# Patient Record
Sex: FEMALE | Race: WHITE | HISPANIC OR LATINO | Employment: FULL TIME | URBAN - METROPOLITAN AREA
[De-identification: names, ages, dates, MRNs, and addresses within clinical notes are randomized per-mention and may not be internally consistent; named-entity substitution may affect disease eponyms.]

---

## 2024-03-26 ENCOUNTER — HOSPITAL ENCOUNTER (EMERGENCY)
Facility: HOSPITAL | Age: 29
Discharge: HOME | End: 2024-03-26
Attending: EMERGENCY MEDICINE | Admitting: EMERGENCY MEDICINE

## 2024-03-26 ENCOUNTER — APPOINTMENT (OUTPATIENT)
Dept: ULTRASOUND IMAGING | Facility: HOSPITAL | Age: 29
End: 2024-03-26

## 2024-03-26 VITALS
DIASTOLIC BLOOD PRESSURE: 98 MMHG | OXYGEN SATURATION: 100 % | SYSTOLIC BLOOD PRESSURE: 120 MMHG | TEMPERATURE: 98.8 F | RESPIRATION RATE: 20 BRPM | HEART RATE: 95 BPM

## 2024-03-26 DIAGNOSIS — Z34.91 FIRST TRIMESTER PREGNANCY: Primary | ICD-10-CM

## 2024-03-26 DIAGNOSIS — R55 NEAR SYNCOPE: ICD-10-CM

## 2024-03-26 LAB
ALBUMIN SERPL-MCNC: 4.7 G/DL (ref 3.5–5.2)
ALBUMIN/GLOB SERPL: 1.7 G/DL
ALP SERPL-CCNC: 49 U/L (ref 39–117)
ALT SERPL W P-5'-P-CCNC: 14 U/L (ref 1–33)
ANION GAP SERPL CALCULATED.3IONS-SCNC: 11 MMOL/L (ref 5–15)
AST SERPL-CCNC: 14 U/L (ref 1–32)
BACTERIA UR QL AUTO: ABNORMAL /HPF
BASOPHILS # BLD AUTO: 0.07 10*3/MM3 (ref 0–0.2)
BASOPHILS NFR BLD AUTO: 0.8 % (ref 0–1.5)
BILIRUB SERPL-MCNC: 0.2 MG/DL (ref 0–1.2)
BILIRUB UR QL STRIP: NEGATIVE
BUN SERPL-MCNC: 11 MG/DL (ref 6–20)
BUN/CREAT SERPL: 14.1 (ref 7–25)
CALCIUM SPEC-SCNC: 9.5 MG/DL (ref 8.6–10.5)
CHLORIDE SERPL-SCNC: 101 MMOL/L (ref 98–107)
CLARITY UR: ABNORMAL
CO2 SERPL-SCNC: 25 MMOL/L (ref 22–29)
COLOR UR: YELLOW
CREAT SERPL-MCNC: 0.78 MG/DL (ref 0.57–1)
DEPRECATED RDW RBC AUTO: 38.3 FL (ref 37–54)
EGFRCR SERPLBLD CKD-EPI 2021: 104.9 ML/MIN/1.73
EOSINOPHIL # BLD AUTO: 0.1 10*3/MM3 (ref 0–0.4)
EOSINOPHIL NFR BLD AUTO: 1.1 % (ref 0.3–6.2)
ERYTHROCYTE [DISTWIDTH] IN BLOOD BY AUTOMATED COUNT: 12.3 % (ref 12.3–15.4)
GLOBULIN UR ELPH-MCNC: 2.8 GM/DL
GLUCOSE SERPL-MCNC: 113 MG/DL (ref 65–99)
GLUCOSE UR STRIP-MCNC: NEGATIVE MG/DL
HCG INTACT+B SERPL-ACNC: 1332 MIU/ML
HCT VFR BLD AUTO: 40.8 % (ref 34–46.6)
HGB BLD-MCNC: 13.2 G/DL (ref 12–15.9)
HGB UR QL STRIP.AUTO: ABNORMAL
HOLD SPECIMEN: NORMAL
HOLD SPECIMEN: NORMAL
HYALINE CASTS UR QL AUTO: ABNORMAL /LPF
IMM GRANULOCYTES # BLD AUTO: 0.02 10*3/MM3 (ref 0–0.05)
IMM GRANULOCYTES NFR BLD AUTO: 0.2 % (ref 0–0.5)
KETONES UR QL STRIP: NEGATIVE
LEUKOCYTE ESTERASE UR QL STRIP.AUTO: NEGATIVE
LYMPHOCYTES # BLD AUTO: 3.11 10*3/MM3 (ref 0.7–3.1)
LYMPHOCYTES NFR BLD AUTO: 33.3 % (ref 19.6–45.3)
MAGNESIUM SERPL-MCNC: 2.7 MG/DL (ref 1.6–2.6)
MCH RBC QN AUTO: 28.2 PG (ref 26.6–33)
MCHC RBC AUTO-ENTMCNC: 32.4 G/DL (ref 31.5–35.7)
MCV RBC AUTO: 87.2 FL (ref 79–97)
MONOCYTES # BLD AUTO: 0.78 10*3/MM3 (ref 0.1–0.9)
MONOCYTES NFR BLD AUTO: 8.4 % (ref 5–12)
NEUTROPHILS NFR BLD AUTO: 5.25 10*3/MM3 (ref 1.7–7)
NEUTROPHILS NFR BLD AUTO: 56.2 % (ref 42.7–76)
NITRITE UR QL STRIP: NEGATIVE
NRBC BLD AUTO-RTO: 0 /100 WBC (ref 0–0.2)
PH UR STRIP.AUTO: 7.5 [PH] (ref 5–8)
PLATELET # BLD AUTO: 164 10*3/MM3 (ref 140–450)
PMV BLD AUTO: 11.5 FL (ref 6–12)
POTASSIUM SERPL-SCNC: 3.9 MMOL/L (ref 3.5–5.2)
PROT SERPL-MCNC: 7.5 G/DL (ref 6–8.5)
PROT UR QL STRIP: NEGATIVE
RBC # BLD AUTO: 4.68 10*6/MM3 (ref 3.77–5.28)
RBC # UR STRIP: ABNORMAL /HPF
REF LAB TEST METHOD: ABNORMAL
SODIUM SERPL-SCNC: 137 MMOL/L (ref 136–145)
SP GR UR STRIP: 1.01 (ref 1–1.03)
SQUAMOUS #/AREA URNS HPF: ABNORMAL /HPF
TROPONIN T SERPL HS-MCNC: <6 NG/L
UROBILINOGEN UR QL STRIP: ABNORMAL
WBC # UR STRIP: ABNORMAL /HPF
WBC NRBC COR # BLD AUTO: 9.33 10*3/MM3 (ref 3.4–10.8)
WHOLE BLOOD HOLD COAG: NORMAL
WHOLE BLOOD HOLD SPECIMEN: NORMAL

## 2024-03-26 PROCEDURE — 83735 ASSAY OF MAGNESIUM: CPT

## 2024-03-26 PROCEDURE — 93976 VASCULAR STUDY: CPT

## 2024-03-26 PROCEDURE — 25810000003 SODIUM CHLORIDE 0.9 % SOLUTION: Performed by: EMERGENCY MEDICINE

## 2024-03-26 PROCEDURE — 80053 COMPREHEN METABOLIC PANEL: CPT

## 2024-03-26 PROCEDURE — 76815 OB US LIMITED FETUS(S): CPT

## 2024-03-26 PROCEDURE — 81001 URINALYSIS AUTO W/SCOPE: CPT | Performed by: EMERGENCY MEDICINE

## 2024-03-26 PROCEDURE — 85025 COMPLETE CBC W/AUTO DIFF WBC: CPT

## 2024-03-26 PROCEDURE — 76817 TRANSVAGINAL US OBSTETRIC: CPT

## 2024-03-26 PROCEDURE — 84702 CHORIONIC GONADOTROPIN TEST: CPT

## 2024-03-26 PROCEDURE — 84484 ASSAY OF TROPONIN QUANT: CPT

## 2024-03-26 PROCEDURE — 99284 EMERGENCY DEPT VISIT MOD MDM: CPT

## 2024-03-26 PROCEDURE — 93005 ELECTROCARDIOGRAM TRACING: CPT | Performed by: EMERGENCY MEDICINE

## 2024-03-26 PROCEDURE — 36415 COLL VENOUS BLD VENIPUNCTURE: CPT

## 2024-03-26 PROCEDURE — 93010 ELECTROCARDIOGRAM REPORT: CPT | Performed by: INTERNAL MEDICINE

## 2024-03-26 PROCEDURE — 93005 ELECTROCARDIOGRAM TRACING: CPT

## 2024-03-26 RX ORDER — SODIUM CHLORIDE 0.9 % (FLUSH) 0.9 %
10 SYRINGE (ML) INJECTION AS NEEDED
Status: DISCONTINUED | OUTPATIENT
Start: 2024-03-26 | End: 2024-03-27 | Stop reason: HOSPADM

## 2024-03-26 RX ADMIN — SODIUM CHLORIDE 1000 ML: 9 INJECTION, SOLUTION INTRAVENOUS at 20:49

## 2024-03-26 NOTE — ED NOTES
Pt was working and had sudden onset near syncopal episode. Pt is 4-6 weeks pregnant. /104.  At arrival to ER pt still c/o dizziness and having difficulty standing.

## 2024-03-27 LAB
QT INTERVAL: 359 MS
QTC INTERVAL: 456 MS

## 2024-03-27 NOTE — ED PROVIDER NOTES
EMERGENCY DEPARTMENT ENCOUNTER    Room Number:  16/16  PCP: Provider, No Known  Historian: Patient      HPI:  Chief Complaint: Pregnant/near syncope  A complete HPI/ROS/PMH/PSH/SH/FH are unobtainable due to: None  Context: Nessa Perales is a 29 y.o. female who presents to the ED c/o fairly sudden onset of lightheadedness with a reported near syncopal episode occurring shortly prior to ED arrival today.  The patient reports that she is unsure how far along she is but did find out she was pregnant 2 days ago.  She denies any loss of consciousness.  She currently denies headache, chest pain, dysuria/hematuria, vaginal bleeding, vaginal discharge, abdominal pain, back pain, or vomiting.            PAST MEDICAL HISTORY  Active Ambulatory Problems     Diagnosis Date Noted    No Active Ambulatory Problems     Resolved Ambulatory Problems     Diagnosis Date Noted    No Resolved Ambulatory Problems     No Additional Past Medical History         PAST SURGICAL HISTORY  History reviewed. No pertinent surgical history.      FAMILY HISTORY  History reviewed. No pertinent family history.      SOCIAL HISTORY  Social History     Socioeconomic History    Marital status: Single         ALLERGIES  Patient has no known allergies.        REVIEW OF SYSTEMS  Review of Systems   Constitutional:  Negative for fever.   HENT:  Negative for sore throat.    Eyes: Negative.    Respiratory:  Negative for cough and shortness of breath.    Cardiovascular:  Negative for chest pain.   Gastrointestinal:  Negative for abdominal pain, diarrhea and vomiting.   Genitourinary:  Negative for dysuria.   Musculoskeletal:  Negative for neck pain.   Skin:  Negative for rash.   Allergic/Immunologic: Negative.    Neurological:  Positive for light-headedness. Negative for weakness, numbness and headaches.   Hematological: Negative.    Psychiatric/Behavioral: Negative.     All other systems reviewed and are negative.         PHYSICAL EXAM  ED Triage Vitals   Temp  Heart Rate Resp BP SpO2   03/26/24 1830 03/26/24 1830 03/26/24 1830 03/26/24 1836 03/26/24 1830   98.8 °F (37.1 °C) 95 18 (!) 172/104 100 %      Temp src Heart Rate Source Patient Position BP Location FiO2 (%)   03/26/24 1830 03/26/24 1830 -- -- --   Tympanic Monitor          Physical Exam  Constitutional:       General: She is not in acute distress.     Appearance: Normal appearance. She is not ill-appearing or toxic-appearing.   HENT:      Head: Normocephalic and atraumatic.   Eyes:      Extraocular Movements: Extraocular movements intact.      Pupils: Pupils are equal, round, and reactive to light.   Cardiovascular:      Rate and Rhythm: Normal rate and regular rhythm.      Heart sounds: No murmur heard.     No friction rub. No gallop.   Pulmonary:      Effort: Pulmonary effort is normal.      Breath sounds: Normal breath sounds.   Abdominal:      General: Abdomen is flat. There is no distension.      Palpations: Abdomen is soft.      Tenderness: There is no abdominal tenderness.   Musculoskeletal:         General: No swelling or tenderness. Normal range of motion.      Cervical back: Normal range of motion and neck supple.   Skin:     General: Skin is warm and dry.   Neurological:      General: No focal deficit present.      Mental Status: She is alert and oriented to person, place, and time.      Sensory: No sensory deficit.      Motor: No weakness.   Psychiatric:         Mood and Affect: Mood normal.         Behavior: Behavior normal.         Vital signs and nursing notes reviewed.          LAB RESULTS  Recent Results (from the past 24 hour(s))   Comprehensive Metabolic Panel    Collection Time: 03/26/24  6:40 PM    Specimen: Arm, Left; Blood   Result Value Ref Range    Glucose 113 (H) 65 - 99 mg/dL    BUN 11 6 - 20 mg/dL    Creatinine 0.78 0.57 - 1.00 mg/dL    Sodium 137 136 - 145 mmol/L    Potassium 3.9 3.5 - 5.2 mmol/L    Chloride 101 98 - 107 mmol/L    CO2 25.0 22.0 - 29.0 mmol/L    Calcium 9.5 8.6 - 10.5  mg/dL    Total Protein 7.5 6.0 - 8.5 g/dL    Albumin 4.7 3.5 - 5.2 g/dL    ALT (SGPT) 14 1 - 33 U/L    AST (SGOT) 14 1 - 32 U/L    Alkaline Phosphatase 49 39 - 117 U/L    Total Bilirubin 0.2 0.0 - 1.2 mg/dL    Globulin 2.8 gm/dL    A/G Ratio 1.7 g/dL    BUN/Creatinine Ratio 14.1 7.0 - 25.0    Anion Gap 11.0 5.0 - 15.0 mmol/L    eGFR 104.9 >60.0 mL/min/1.73   Magnesium    Collection Time: 03/26/24  6:40 PM    Specimen: Arm, Left; Blood   Result Value Ref Range    Magnesium 2.7 (H) 1.6 - 2.6 mg/dL   Single High Sensitivity Troponin T    Collection Time: 03/26/24  6:40 PM    Specimen: Arm, Left; Blood   Result Value Ref Range    HS Troponin T <6 <14 ng/L   hCG, Quantitative, Pregnancy    Collection Time: 03/26/24  6:40 PM    Specimen: Arm, Left; Blood   Result Value Ref Range    HCG Quantitative 1,332.00 mIU/mL   Green Top (Gel)    Collection Time: 03/26/24  6:40 PM   Result Value Ref Range    Extra Tube Hold for add-ons.    Lavender Top    Collection Time: 03/26/24  6:40 PM   Result Value Ref Range    Extra Tube hold for add-on    Gold Top - SST    Collection Time: 03/26/24  6:40 PM   Result Value Ref Range    Extra Tube Hold for add-ons.    Light Blue Top    Collection Time: 03/26/24  6:40 PM   Result Value Ref Range    Extra Tube Hold for add-ons.    CBC Auto Differential    Collection Time: 03/26/24  6:40 PM    Specimen: Arm, Left; Blood   Result Value Ref Range    WBC 9.33 3.40 - 10.80 10*3/mm3    RBC 4.68 3.77 - 5.28 10*6/mm3    Hemoglobin 13.2 12.0 - 15.9 g/dL    Hematocrit 40.8 34.0 - 46.6 %    MCV 87.2 79.0 - 97.0 fL    MCH 28.2 26.6 - 33.0 pg    MCHC 32.4 31.5 - 35.7 g/dL    RDW 12.3 12.3 - 15.4 %    RDW-SD 38.3 37.0 - 54.0 fl    MPV 11.5 6.0 - 12.0 fL    Platelets 164 140 - 450 10*3/mm3    Neutrophil % 56.2 42.7 - 76.0 %    Lymphocyte % 33.3 19.6 - 45.3 %    Monocyte % 8.4 5.0 - 12.0 %    Eosinophil % 1.1 0.3 - 6.2 %    Basophil % 0.8 0.0 - 1.5 %    Immature Grans % 0.2 0.0 - 0.5 %    Neutrophils, Absolute  5.25 1.70 - 7.00 10*3/mm3    Lymphocytes, Absolute 3.11 (H) 0.70 - 3.10 10*3/mm3    Monocytes, Absolute 0.78 0.10 - 0.90 10*3/mm3    Eosinophils, Absolute 0.10 0.00 - 0.40 10*3/mm3    Basophils, Absolute 0.07 0.00 - 0.20 10*3/mm3    Immature Grans, Absolute 0.02 0.00 - 0.05 10*3/mm3    nRBC 0.0 0.0 - 0.2 /100 WBC   ECG 12 Lead ED Triage Standing Order; Syncope    Collection Time: 03/26/24  6:40 PM   Result Value Ref Range    QT Interval 359 ms    QTC Interval 456 ms   Urinalysis With Microscopic If Indicated (No Culture) - Urine, Clean Catch    Collection Time: 03/26/24  8:42 PM    Specimen: Urine, Clean Catch   Result Value Ref Range    Color, UA Yellow Yellow, Straw    Appearance, UA Cloudy (A) Clear    pH, UA 7.5 5.0 - 8.0    Specific Gravity, UA 1.012 1.005 - 1.030    Glucose, UA Negative Negative    Ketones, UA Negative Negative    Bilirubin, UA Negative Negative    Blood, UA Trace (A) Negative    Protein, UA Negative Negative    Leuk Esterase, UA Negative Negative    Nitrite, UA Negative Negative    Urobilinogen, UA 0.2 E.U./dL 0.2 - 1.0 E.U./dL   Urinalysis, Microscopic Only - Urine, Clean Catch    Collection Time: 03/26/24  8:42 PM    Specimen: Urine, Clean Catch   Result Value Ref Range    RBC, UA 11-20 (A) None Seen, 0-2 /HPF    WBC, UA 3-5 (A) None Seen, 0-2 /HPF    Bacteria, UA 1+ (A) None Seen /HPF    Squamous Epithelial Cells, UA 7-12 (A) None Seen, 0-2 /HPF    Hyaline Casts, UA None Seen None Seen /LPF    Methodology Automated Microscopy        Ordered the above labs and reviewed the results.        RADIOLOGY  US Ob Limited 1 + Fetuses, US Ob Transvaginal, US Testicular or Ovarian Vascular Limited    Result Date: 3/26/2024  US OB LIMITED 1 + FETUSES-, US TESTICULAR OR OVARIAN VASCULAR LIMITED-, US OB TRANSVAGINAL-3/26/2024  HISTORY: Pregnant with left lower pelvic pain.  Endovaginal and transabdominal imaging was performed. There is a tiny fluid collection in the uterus which may demonstrate a small  decidual reaction. Using this as a gestational sac the gestational age is approximately 5 weeks 1 day. A definite yolk sac or fetal pole is not seen.  Bilateral ovaries are not enlarged. Left ovary measures 2.6 cm. Right ovary measures 3.1 cm. There is an approximately 1.8 cm mixed echotexture right adnexal lesion possibly a small corpus luteum cyst.  No free fluid is seen. There is vascular flow with Doppler and duplex signal in both ovaries.      1. Small intrauterine fluid collection may represent a very early intrauterine gestational sac with gestational age of approximately 5 weeks 1 day. 2. No yolk sac or fetal pole is seen and therefore follow-up is recommended. 3. Small 1.8 cm mixed echotexture right adnexal lesion possibly corpus luteum cyst.         Ordered the above noted radiological studies. Reviewed by me in PACS.            PROCEDURES  Procedures    EKG          EKG time: 1840  Rhythm/Rate: NSR, 97  P waves and [place]: nml  QRS, axis: nml, nml   ST and T waves: nml     Interpreted Contemporaneously by me, independently viewed  No previous EKG available for comparison            MEDICATIONS GIVEN IN ER  Medications   sodium chloride 0.9 % flush 10 mL (has no administration in time range)   sodium chloride 0.9 % flush 10 mL (has no administration in time range)   sodium chloride 0.9 % bolus 1,000 mL (1,000 mL Intravenous New Bag 3/26/24 2049)                   MEDICAL DECISION MAKING, PROGRESS, and CONSULTS    All labs have been independently reviewed by me.  All radiology studies have been reviewed by me and I have also reviewed the radiology report.   EKG's independently viewed and interpreted by me.  Discussion below represents my analysis of pertinent findings related to patient's condition, differential diagnosis, treatment plan and final disposition.      Additional sources:  - Discussed/ obtained information from independent historians: History obtained from the patient herself at bedside.    -  External (non-ED) record review: There are no previous inpatient or outpatient records currently available for ED review.    - Chronic or social conditions impacting care: Current first trimester pregnancy    - Shared decision making: Discharge decision based on shared conversations have between myself as well as the patient at bedside.      Orders placed during this visit:  Orders Placed This Encounter   Procedures    US Ob Limited 1 + Fetuses    US Ob Transvaginal    US Testicular or Ovarian Vascular Limited    [place] Draw    Comprehensive Metabolic Panel    Magnesium    Single High Sensitivity Troponin T    hCG, Quantitative, Pregnancy    CBC Auto Differential    Urinalysis With Microscopic If Indicated (No Culture) - Urine, Clean Catch    Urinalysis, Microscopic Only - Urine, Clean Catch    NPO Diet NPO Type: Strict NPO    Undress & Gown    Continuous Pulse Oximetry    Vital Signs    Orthostatic Blood Pressure    Oxygen Therapy- Nasal Cannula; Titrate 1-6 LPM Per SpO2; 90 - 95%    POC Glucose Once    ECG 12 Lead ED Triage Standing Order; Syncope    Insert Peripheral IV    Insert Peripheral IV    CBC & Differential    Green Top (Gel)    Lavender Top    Gold Top - SST    Light Blue Top         Differential diagnosis includes but is not limited to:    Near syncope, pregnancy related lightheadedness, dehydration, urinary tract infection, acute anemia, ectopic pregnancy, or electrolyte disturbance      Independent interpretation of labs, radiology studies, and discussions with consultants:    Laboratory results were independently interpreted by myself with my interpretation showing a normal CBC and complete metabolic panel.  Her quantitative hCG level resulted at 1332 consistent with early pregnancy.        ED Course as of 03/26/24 2251   Tue Mar 26, 2024   2248 On reevaluation, the patient is resting comfortably and reports significant improvement in her symptoms.  Vital signs are stable.  I informed her that her  ultrasound does show a likely 5-week IUP.  She has an OB GYN appointment scheduled and I have encouraged her to keep that.  She has also been encouraged to increase her fluid intake.  At this point, the patient is stable for discharge and all questions have been answered. [BM]      ED Course User Index  [BM] Ge Hernandez MD             DIAGNOSIS  Final diagnoses:   First trimester pregnancy   Near syncope         DISPOSITION  DISCHARGE    Patient discharged in stable condition.    Reviewed implications of results, diagnosis, meds, responsibility to follow up, warning signs and symptoms of possible worsening, potential complications and reasons to return to ER.    Patient/Family voiced understanding of above instructions.    Discussed plan for discharge, as there is no emergent indication for admission. Patient referred to primary care provider for BP management due to today's BP. Pt/family is agreeable and understands need for follow up and repeat testing.  Pt is aware that discharge does not mean that nothing is wrong but it indicates no emergency is present that requires admission and they must continue care with follow-up as given below or physician of their choice.     FOLLOW-UP  [facility]YSICAN REFERRAL SERVICE  [facility] [id number] [phone]  Schedule an appointment as soon as possible for a visit            Medication List      No changes were made to your prescriptions during this visit.                   Latest Documented Vital Signs:  As of 22:51 EDT  BP- 123/75 HR- 101 Temp- 98.8 °F (37.1 °C) (Tympanic) O2 sat- 100%              --    Please note that portions of this were completed with a voice recognition program.       Note Disclaimer: At [facility], we believe that sharing information builds trust and better relationships. You are receiving this note because you are receiving care at [facility] or recently visited. It is possible you will see [facility]  information before a provider has talked with you about it. This kind of information can be easy to misunderstand. To help you fully understand what it means for your health, we urge you to discuss this note with your provider.             Ge Hernandez MD  03/26/24 3131

## 2024-03-27 NOTE — DISCHARGE INSTRUCTIONS
Increase fluid intake.  Follow-up with your OB/GYN as scheduled or earlier if need be.  Return to the emergency room if any changes or concerns.

## 2024-03-27 NOTE — ED NOTES
Pt c/o dizzinness/lightheadedness that started today. Pt reports some abd pain but denies vaginal bleeding. Pt is approx 4-6wks preg

## 2024-04-08 ENCOUNTER — HOSPITAL ENCOUNTER (EMERGENCY)
Facility: HOSPITAL | Age: 29
Discharge: HOME | End: 2024-04-09
Attending: EMERGENCY MEDICINE | Admitting: EMERGENCY MEDICINE
Payer: COMMERCIAL

## 2024-04-08 ENCOUNTER — APPOINTMENT (OUTPATIENT)
Dept: ULTRASOUND IMAGING | Facility: HOSPITAL | Age: 29
End: 2024-04-08
Payer: COMMERCIAL

## 2024-04-08 VITALS
DIASTOLIC BLOOD PRESSURE: 97 MMHG | RESPIRATION RATE: 18 BRPM | HEART RATE: 79 BPM | OXYGEN SATURATION: 100 % | SYSTOLIC BLOOD PRESSURE: 148 MMHG | TEMPERATURE: 98.4 F

## 2024-04-08 DIAGNOSIS — O20.0 THREATENED MISCARRIAGE IN EARLY PREGNANCY: Primary | ICD-10-CM

## 2024-04-08 DIAGNOSIS — O41.8X10 SUBCHORIONIC HEMATOMA IN FIRST TRIMESTER, SINGLE OR UNSPECIFIED FETUS: ICD-10-CM

## 2024-04-08 DIAGNOSIS — O46.8X1 SUBCHORIONIC HEMATOMA IN FIRST TRIMESTER, SINGLE OR UNSPECIFIED FETUS: ICD-10-CM

## 2024-04-08 LAB
BASOPHILS # BLD AUTO: 0.05 10*3/MM3 (ref 0–0.2)
BASOPHILS NFR BLD AUTO: 0.5 % (ref 0–1.5)
DEPRECATED RDW RBC AUTO: 40.3 FL (ref 37–54)
EOSINOPHIL # BLD AUTO: 0.06 10*3/MM3 (ref 0–0.4)
EOSINOPHIL NFR BLD AUTO: 0.6 % (ref 0.3–6.2)
ERYTHROCYTE [DISTWIDTH] IN BLOOD BY AUTOMATED COUNT: 12.4 % (ref 12.3–15.4)
HCT VFR BLD AUTO: 38.5 % (ref 34–46.6)
HGB BLD-MCNC: 12.7 G/DL (ref 12–15.9)
IMM GRANULOCYTES # BLD AUTO: 0.05 10*3/MM3 (ref 0–0.05)
IMM GRANULOCYTES NFR BLD AUTO: 0.5 % (ref 0–0.5)
LYMPHOCYTES # BLD AUTO: 2.18 10*3/MM3 (ref 0.7–3.1)
LYMPHOCYTES NFR BLD AUTO: 20.7 % (ref 19.6–45.3)
MCH RBC QN AUTO: 29.5 PG (ref 26.6–33)
MCHC RBC AUTO-ENTMCNC: 33 G/DL (ref 31.5–35.7)
MCV RBC AUTO: 89.5 FL (ref 79–97)
MONOCYTES # BLD AUTO: 0.84 10*3/MM3 (ref 0.1–0.9)
MONOCYTES NFR BLD AUTO: 8 % (ref 5–12)
NEUTROPHILS NFR BLD AUTO: 69.7 % (ref 42.7–76)
NEUTROPHILS NFR BLD AUTO: 7.35 10*3/MM3 (ref 1.7–7)
NRBC BLD AUTO-RTO: 0 /100 WBC (ref 0–0.2)
PLATELET # BLD AUTO: 137 10*3/MM3 (ref 140–450)
PMV BLD AUTO: 12.4 FL (ref 6–12)
RBC # BLD AUTO: 4.3 10*6/MM3 (ref 3.77–5.28)
WBC NRBC COR # BLD AUTO: 10.53 10*3/MM3 (ref 3.4–10.8)

## 2024-04-08 PROCEDURE — 80048 BASIC METABOLIC PNL TOTAL CA: CPT | Performed by: EMERGENCY MEDICINE

## 2024-04-08 PROCEDURE — 99284 EMERGENCY DEPT VISIT MOD MDM: CPT

## 2024-04-08 PROCEDURE — 85025 COMPLETE CBC W/AUTO DIFF WBC: CPT | Performed by: EMERGENCY MEDICINE

## 2024-04-08 PROCEDURE — 86901 BLOOD TYPING SEROLOGIC RH(D): CPT | Performed by: EMERGENCY MEDICINE

## 2024-04-08 PROCEDURE — 84702 CHORIONIC GONADOTROPIN TEST: CPT | Performed by: EMERGENCY MEDICINE

## 2024-04-08 PROCEDURE — 76817 TRANSVAGINAL US OBSTETRIC: CPT

## 2024-04-08 PROCEDURE — 76815 OB US LIMITED FETUS(S): CPT

## 2024-04-08 PROCEDURE — 86900 BLOOD TYPING SEROLOGIC ABO: CPT | Performed by: EMERGENCY MEDICINE

## 2024-04-08 PROCEDURE — 86850 RBC ANTIBODY SCREEN: CPT | Performed by: EMERGENCY MEDICINE

## 2024-04-08 RX ORDER — SODIUM CHLORIDE 0.9 % (FLUSH) 0.9 %
10 SYRINGE (ML) INJECTION AS NEEDED
Status: DISCONTINUED | OUTPATIENT
Start: 2024-04-08 | End: 2024-04-09 | Stop reason: HOSPADM

## 2024-04-09 LAB
ABO GROUP BLD: NORMAL
ANION GAP SERPL CALCULATED.3IONS-SCNC: 12.9 MMOL/L (ref 5–15)
BLD GP AB SCN SERPL QL: NEGATIVE
BUN SERPL-MCNC: 10 MG/DL (ref 6–20)
BUN/CREAT SERPL: 15.2 (ref 7–25)
CALCIUM SPEC-SCNC: 8.8 MG/DL (ref 8.6–10.5)
CHLORIDE SERPL-SCNC: 102 MMOL/L (ref 98–107)
CO2 SERPL-SCNC: 21.1 MMOL/L (ref 22–29)
CREAT SERPL-MCNC: 0.66 MG/DL (ref 0.57–1)
EGFRCR SERPLBLD CKD-EPI 2021: 121.2 ML/MIN/1.73
GLUCOSE SERPL-MCNC: 86 MG/DL (ref 65–99)
HCG INTACT+B SERPL-ACNC: NORMAL MIU/ML
POTASSIUM SERPL-SCNC: 3.5 MMOL/L (ref 3.5–5.2)
RH BLD: POSITIVE
SODIUM SERPL-SCNC: 136 MMOL/L (ref 136–145)
T&S EXPIRATION DATE: NORMAL

## 2024-04-09 NOTE — ED PROVIDER NOTES
EMERGENCY DEPARTMENT ENCOUNTER  Room Number:  24/24  PCP: Provider, No Known  Independent Historians: Patient      HPI:  Chief Complaint: had concerns including Vaginal Bleeding - Pregnant.     A complete HPI/ROS/PMH/PSH/SH/FH are unobtainable due to: Nothing      Context: Nessa Perales is a 29 y.o. female who presents to the ED c/o acute vaginal bleeding during early pregnancy.  Patient states that she is about 6 weeks pregnant.  She has not yet seen her OB/GYN but she has an appointment on April 10.  She had a little bit of spotting last night but this morning it was a bit heavier.  She denies any pain or cramping.      Review of prior external notes (non-ED) -and- Review of prior external test results outside of this encounter: N/A        PAST MEDICAL HISTORY  Active Ambulatory Problems     Diagnosis Date Noted    No Active Ambulatory Problems     Resolved Ambulatory Problems     Diagnosis Date Noted    No Resolved Ambulatory Problems     No Additional Past Medical History         PAST SURGICAL HISTORY  History reviewed. No pertinent surgical history.      FAMILY HISTORY  History reviewed. No pertinent family history.      SOCIAL HISTORY  Social History     Socioeconomic History    Marital status: Single   Tobacco Use    Smoking status: Never    Smokeless tobacco: Never   Substance and Sexual Activity    Alcohol use: Not Currently    Drug use: Never         ALLERGIES  Patient has no known allergies.      REVIEW OF SYSTEMS  Review of all 14 systems is negative other than stated in the HPI above.      PHYSICAL EXAM    I have reviewed the triage vital signs and nursing notes.    ED Triage Vitals   Temp Heart Rate Resp BP SpO2   04/08/24 2209 04/08/24 2207 04/08/24 2207 04/08/24 2209 04/08/24 2207   98.4 °F (36.9 °C) 79 18 148/97 100 %      Temp src Heart Rate Source Patient Position BP Location FiO2 (%)   04/08/24 2209 04/08/24 2207 -- 04/08/24 2209 --   Tympanic Monitor  Right arm          GENERAL: awake and  alert, well-appearing, no acute distress  HENT: Normocephalic, atraumatic  EYES: no scleral icterus, EOMI  CV: regular rhythm, regular rate  RESPIRATORY: normal effort  ABDOMEN: soft, nontender throughout.   exam deferred.  MUSCULOSKELETAL: no deformity  NEURO: alert, moves all extremities, follows commands  PSYCH: calm, cooperative  SKIN: Warm, dry          LAB RESULTS  Recent Results (from the past 24 hour(s))   Basic Metabolic Panel    Collection Time: 04/08/24 11:05 PM    Specimen: Blood   Result Value Ref Range    Glucose 86 65 - 99 mg/dL    BUN 10 6 - 20 mg/dL    Creatinine 0.66 0.57 - 1.00 mg/dL    Sodium 136 136 - 145 mmol/L    Potassium 3.5 3.5 - 5.2 mmol/L    Chloride 102 98 - 107 mmol/L    CO2 21.1 (L) 22.0 - 29.0 mmol/L    Calcium 8.8 8.6 - 10.5 mg/dL    BUN/Creatinine Ratio 15.2 7.0 - 25.0    Anion Gap 12.9 5.0 - 15.0 mmol/L    eGFR 121.2 >60.0 mL/min/1.73   hCG, Quantitative, Pregnancy    Collection Time: 04/08/24 11:05 PM    Specimen: Blood   Result Value Ref Range    HCG Quantitative 51,417.00 mIU/mL   Type & Screen    Collection Time: 04/08/24 11:05 PM    Specimen: Blood   Result Value Ref Range    ABO Type O     RH type Positive     Antibody Screen Negative     T&S Expiration Date 4/11/2024 11:59:59 PM    CBC Auto Differential    Collection Time: 04/08/24 11:05 PM    Specimen: Blood   Result Value Ref Range    WBC 10.53 3.40 - 10.80 10*3/mm3    RBC 4.30 3.77 - 5.28 10*6/mm3    Hemoglobin 12.7 12.0 - 15.9 g/dL    Hematocrit 38.5 34.0 - 46.6 %    MCV 89.5 79.0 - 97.0 fL    MCH 29.5 26.6 - 33.0 pg    MCHC 33.0 31.5 - 35.7 g/dL    RDW 12.4 12.3 - 15.4 %    RDW-SD 40.3 37.0 - 54.0 fl    MPV 12.4 (H) 6.0 - 12.0 fL    Platelets 137 (L) 140 - 450 10*3/mm3    Neutrophil % 69.7 42.7 - 76.0 %    Lymphocyte % 20.7 19.6 - 45.3 %    Monocyte % 8.0 5.0 - 12.0 %    Eosinophil % 0.6 0.3 - 6.2 %    Basophil % 0.5 0.0 - 1.5 %    Immature Grans % 0.5 0.0 - 0.5 %    Neutrophils, Absolute 7.35 (H) 1.70 - 7.00 10*3/mm3     Lymphocytes, Absolute 2.18 0.70 - 3.10 10*3/mm3    Monocytes, Absolute 0.84 0.10 - 0.90 10*3/mm3    Eosinophils, Absolute 0.06 0.00 - 0.40 10*3/mm3    Basophils, Absolute 0.05 0.00 - 0.20 10*3/mm3    Immature Grans, Absolute 0.05 0.00 - 0.05 10*3/mm3    nRBC 0.0 0.0 - 0.2 /100 WBC       The above labs were ordered by me and independently reviewed by me.     RADIOLOGY  US Ob Limited 1 + Fetuses    Result Date: 4/9/2024  Patient: EDUAR HENRIQUEZ  Time Out: 00:56 Exam(s): US OB LIMITED EXAM:   US Pregnancy, Transabdominal and Transvaginal with Doppler CLINICAL HISTORY:    Reason for exam: vaginal bleeding 6 wks pregnant. TECHNIQUE:   Real-time transabdominal and transvaginal obstetrical ultrasound of the maternal pelvis and a first trimester pregnancy with image documentation.  Transvaginal imaging was used for better evaluation of the fetus and adnexa.  Color Doppler and Doppler spectral waveform analysis is performed of the ovarian tissue. COMPARISON:   March 26, 2024 FINDINGS:   Gestation:  There is a single intrauterine gestational sac with mean sac diameter measuring 2.18 cm consistent with 7 weeks 1 day gestation.  Transvaginal mean sac diameter is 1.55 cm consistent with 6 weeks 3 days.  There is a yolk sac measuring 3 mm.  Fetal pole crown-rump length measures 5 mm consistent with 6 weeks 2 days gestation.  Fetal heart rate 129 bpm.   Placenta amniotic fluid:  There is a trace subchorionic hemorrhage measuring 2 mm thick brown 0.6 cm.   Uterus cervix:  The uterus measures 11 x 5.6 x 6.4 cm, 207 mL.  Transvaginally the uterus measures 5.5 x 10.2 x 4.9 cm, 144 mL.  No myometrial mass.   Ovaries:  The right ovary measures 2.7 x 3.9 x 2.4 cm, 13.4 mL with normal Doppler blood flow.  There is a 1.5 cm corpus luteum cyst within it.  The left ovary measures 1.2 x 3.4 x 1.4 cm, 3 mL with normal Doppler blood flow.  No mass.   Free fluid:  No free fluid. IMPRESSION:     Single, live intrauterine fetus with estimated  gestational age 6 weeks 2 days.  The estimated date of delivery by ultrasound is November 30, 2024.  There is a small subchorionic hemorrhage.    Electronically signed by Bonifacio Simon MD on 04-09-24 at 0056    US Ob Transvaginal    Result Date: 4/9/2024  Patient: EDUAR HENRIQUEZ  Time Out: 00:55 Exam(s): US OB ENDOVAG EXAM:   US Pregnancy, Transabdominal and Transvaginal with Doppler CLINICAL HISTORY:    Reason for exam: vaginal bleeding 6 wks pregnant. TECHNIQUE:   Real-time transabdominal and transvaginal obstetrical ultrasound of the maternal pelvis and a first trimester pregnancy with image documentation.  Transvaginal imaging was used for better evaluation of the fetus and adnexa.  Color Doppler and Doppler spectral waveform analysis is performed of the ovarian tissue. COMPARISON:   March 26, 2024 FINDINGS:   Gestation:  There is a single intrauterine gestational sac with mean sac diameter measuring 2.18 cm consistent with 7 weeks 1 day gestation.  Transvaginal mean sac diameter is 1.55 cm consistent with 6 weeks 3 days.  There is a yolk sac measuring 3 mm.  Fetal pole crown-rump length measures 5 mm consistent with 6 weeks 2 days gestation.  Fetal heart rate 129 bpm.   Placenta amniotic fluid:  There is a trace subchorionic hemorrhage measuring 2 mm thick brown 0.6 cm.   Uterus cervix:  The uterus measures 11 x 5.6 x 6.4 cm, 207 mL.  Transvaginally the uterus measures 5.5 x 10.2 x 4.9 cm, 144 mL.  No myometrial mass.   Ovaries:  The right ovary measures 2.7 x 3.9 x 2.4 cm, 13.4 mL with normal Doppler blood flow.  There is a 1.5 cm corpus luteum cyst within it.  The left ovary measures 1.2 x 3.4 x 1.4 cm, 3 mL with normal Doppler blood flow.  No mass.   Free fluid:  No free fluid. IMPRESSION:     Single, live intrauterine fetus with estimated gestational age 6 weeks 2 days.  The estimated date of delivery by ultrasound is November 30, 2024.  There is a small subchorionic hemorrhage.    Electronically signed  by Bonifacio Simon MD on 04-09-24 at 0055     The above radiology studies were ordered by me.  See ED course for independent interpretations.     MEDICATIONS GIVEN IN ER  Medications   sodium chloride 0.9 % flush 10 mL (has no administration in time range)         ORDERS PLACED DURING THIS VISIT:  Orders Placed This Encounter   Procedures    US Ob Transvaginal    US Ob Limited 1 + Fetuses    Basic Metabolic Panel    hCG, Quantitative, Pregnancy    CBC Auto Differential    Type & Screen    Insert Peripheral IV    CBC & Differential         OUTPATIENT MEDICATION MANAGEMENT:  Current Facility-Administered Medications Ordered in Epic   Medication Dose Route Frequency Provider Last Rate Last Admin    sodium chloride 0.9 % flush 10 mL  10 mL Intravenous PRN Tang Cabezas MD         No current [facility]-ordered outpatient medications on file.         PROCEDURES  Procedures            PROGRESS, DATA ANALYSIS, CONSULTS, AND MEDICAL DECISION MAKING  All labs have been independently interpreted by me.  All radiology studies have been reviewed by me. All EKG's have been independently viewed and interpreted by me.  Discussion below represents my analysis of pertinent findings related to patient's condition, differential diagnosis, treatment plan and final disposition.    Differential diagnosis includes but is not limited to:  Ectopic pregnancy  Threatened miscarriage  Missed AB  Subchorionic hematoma      Clinical Scores:                  ED Course as of 04/09/24 0303   Mon Apr 08, 2024   2329 Hemoglobin: 12.7 [JR]   Tue Apr 09, 2024   0056 HCG Quantitative: 51,417.00 [JR]   0056 RH type: Positive [JR]   0056 Fetal ultrasound demonstrates live IUP measuring 6 weeks 2 days with a small subchorionic hematoma. [JR]   0056 Rh+, no indication for RhoGAM.  Patient appropriate for discharge with close OB/GYN follow-up.  She has an appointment scheduled for April 10. [JR]      ED Course User Index  [JR] Tang Cabezas MD              AS OF 03:03 EDT VITALS:    BP - 148/97  HR - 79  TEMP - 98.4 °F (36.9 °C) (Tympanic)  O2 SATS - 100%    COMPLEXITY OF CARE        Chronic or social conditions impacting patient care (Social Determinants of Health):     DIAGNOSIS  Final diagnoses:   Threatened miscarriage in early pregnancy   Subchorionic hematoma in first trimester, single or unspecified fetus           DISPOSITION  DISCHARGE    Patient discharged in stable condition.    Reviewed implications of results, diagnosis, meds, responsibility to follow up, warning signs and symptoms of possible worsening, potential complications and reasons to return to ER.    Patient/Family voiced understanding of above instructions.    Discussed plan for discharge, as there is no emergent indication for admission. Patient referred to primary care provider for BP management due to today's BP. Pt/family is agreeable and understands need for follow up and repeat testing.  Pt is aware that discharge does not mean that nothing is wrong but it indicates no emergency is present that requires admission and they must continue care with follow-up as given below or physician of their choice.     FOLLOW-UP  Desire Yang, APRN  [address] [address]  [phone]    Go in 1 day           Medication List      No changes were made to your prescriptions during this visit.             Prescription drug monitoring program review:           Please note that portions of this document were completed with a voice recognition program.    Note Disclaimer: At [facility], we believe that sharing information builds trust and better relationships. You are receiving this note because you recently visited [facility]. It is possible you will see health information before a provider has talked with you about it. This kind of information can be easy to misunderstand. To help you fully understand what it means for your health, we urge you to  discuss this note with your provider.         Tang Cabezas MD  04/09/24 4063

## 2024-04-09 NOTE — ED NOTES
Pt arrived via pov from home w/ c/o bleeding during pregnancy. Pt reports she began bleeding yesterday 4/7. Pt reports a small amount of blood when wiping noted. Pt states she is approx. x6 weeks pregnant.

## 2024-04-10 ENCOUNTER — OFFICE VISIT (OUTPATIENT)
Dept: OBSTETRICS AND GYNECOLOGY | Age: 29
End: 2024-04-10
Payer: COMMERCIAL

## 2024-04-10 VITALS
WEIGHT: 129.4 LBS | SYSTOLIC BLOOD PRESSURE: 118 MMHG | DIASTOLIC BLOOD PRESSURE: 64 MMHG | BODY MASS INDEX: 20.79 KG/M2 | HEIGHT: 66 IN

## 2024-04-10 DIAGNOSIS — Z3A.01 7 WEEKS GESTATION OF PREGNANCY: Primary | ICD-10-CM

## 2024-04-10 DIAGNOSIS — O09.299 HISTORY OF PRE-ECLAMPSIA IN PRIOR PREGNANCY, CURRENTLY PREGNANT: ICD-10-CM

## 2024-04-10 DIAGNOSIS — N89.8 VAGINAL DISCHARGE: ICD-10-CM

## 2024-04-10 DIAGNOSIS — O41.8X11 SUBCHORIONIC HEMATOMA IN FIRST TRIMESTER, FETUS 1 OF MULTIPLE GESTATION: ICD-10-CM

## 2024-04-10 DIAGNOSIS — O20.9 BLEEDING IN EARLY PREGNANCY: ICD-10-CM

## 2024-04-10 DIAGNOSIS — O46.8X1 SUBCHORIONIC HEMATOMA IN FIRST TRIMESTER, FETUS 1 OF MULTIPLE GESTATION: ICD-10-CM

## 2024-04-10 RX ORDER — PRENATAL VIT/IRON FUM/FOLIC AC 27MG-0.8MG
TABLET ORAL DAILY
COMMUNITY

## 2024-04-10 NOTE — PROGRESS NOTES
[facility]   Obstetrics and Gynecology   New Gynecology Visit    4/10/2024    Patient: Nessa Perales          MR#:[id number]    History of Present Illness    Chief Complaint   Patient presents with    Possible Pregnancy     New gyn, pregnancy confirmation, should be about 6w4d according to the US that was done on 24 for threatened miscarriage, pt states she is barely spotting anymore, pt states she is getting headaches and her hands get warm when she is up and moving around and is concerned about her blood pressure       Patient plans to see Dr. Bautista    29 y.o. female  who presents for confirmation and viability appt  She was seen in the ER two days ago for bleeding and cramping that was reported as mild  Today it is scant no cramping  No IC reported recently   Has had two successful deliveries   PCS for fetal distress   Hx of Preeclampsia with first delivery in   Did not have this with delivery in 2018  Works at [employer] for 5 months  Last pap a year ago  Reports in [place] she was started on a bp med   She stopped this 8 months ago because she ran out and did not have insurance unsure of what the name was  Reports hx of bv and wants to be checked    Studies reviewed:      Obstetric History:  OB History          4    Para   2    Term   2            AB   1    Living   2         SAB        IAB        Ectopic        Molar        Multiple        Live Births   2               Menstrual History:     Patient's last menstrual period was 2024 (exact date).       Sexual History:         Social History:    ________________________________________  There is no problem list on file for this patient.    Past Medical History:   Diagnosis Date    Preeclampsia      Past Surgical History:   Procedure Laterality Date     SECTION       Social History     Tobacco Use   Smoking Status Never   Smokeless Tobacco Never     Family History   Problem Relation Age of Onset     "Breast cancer Maternal Aunt 43     Prior to Admission medications    Medication Sig Start Date End Date Taking? Authorizing Provider   Prenatal Vit-Fe Fumarate-FA (prenatal vitamin 27-0.8) 27-0.8 MG tablet tablet Take  by mouth Daily.   Yes Provider, MD Charlene     ________________________________________    The following portions of the patient's history were reviewed and updated as appropriate: allergies, current medications, past family history, past medical history, past social history, past surgical history, and problem list.    Review of Systems   Constitutional: Negative.    HENT: Negative.     Eyes: Negative.    Respiratory: Negative.     Cardiovascular: Negative.    Gastrointestinal: Negative.    Endocrine: Negative.    Genitourinary:  Positive for vaginal bleeding.   Musculoskeletal: Negative.    Skin: Negative.    Allergic/Immunologic: Negative.    Neurological: Negative.    Hematological: Negative.    Psychiatric/Behavioral: Negative.              Objective     /64   Ht 167.6 cm (66\")   Wt 58.7 kg (129 lb 6.4 oz)   LMP 02/21/2024 (Exact Date)   BMI 20.89 kg/m²    BP Readings from Last 3 Encounters:   04/10/24 118/64   04/08/24 148/97   03/26/24 120/98      Wt Readings from Last 3 Encounters:   04/10/24 58.7 kg (129 lb 6.4 oz)        BMI: Estimated body mass index is 20.89 kg/m² as calculated from the following:    Height as of this encounter: 167.6 cm (66\").    Weight as of this encounter: 58.7 kg (129 lb 6.4 oz).    Physical Exam  Vitals and nursing note reviewed.   Constitutional:       Appearance: Normal appearance.   Cardiovascular:      Rate and Rhythm: Normal rate.   Pulmonary:      Effort: Pulmonary effort is normal.   Genitourinary:     Vagina: Vaginal discharge present.      Cervix: No cervical bleeding.      Comments: Cervix is closed  No bleeding is seen  Thin white discharge is noted  Musculoskeletal:      Cervical back: Full passive range of motion without pain.   Skin:     " General: Skin is warm and dry.   Neurological:      General: No focal deficit present.      Mental Status: She is alert and oriented to person, place, and time.   Psychiatric:         Attention and Perception: Attention normal.         Mood and Affect: Mood normal.         Speech: Speech normal.         Behavior: Behavior normal.         Thought Content: Thought content normal.         Judgment: Judgment normal.                 Assessment:  Diagnoses and all orders for this visit:    1. 7 weeks gestation of pregnancy (Primary)  -     CBC (No Diff)  -     Rubella Antibody, IgG  -     Varicella Zoster Antibody, IgG  -     Hemoglobin A1c  -     HIV-1 / O / 2 Ag / Antibody  -     Hepatitis C Antibody  -     Hepatitis B Surface Antigen  -     RPR, Rfx Qn RPR / Confirm TP  -     Antibody Screen  -     ABO / Rh  -     Hemoglobinopathy Fractionation Dundy  -     TSH Rfx On Abnormal To Free T4    2. Subchorionic hematoma in first trimester, fetus 1 of multiple gestation    3. History of pre-eclampsia in prior pregnancy, currently pregnant    4. Bleeding in early pregnancy    5. Vaginal discharge  -     NuSwab VG+ - Swab, Vagina      Impression:  Intrauterine pregnancy at 7w0d  Viable first trimester gestation,   Subchorionic hemorrhage noted  Basis for EDC: LMP consistent with US (first trimester)  Small subchorionic noted 1.5 x 1.2 cm and 1.3 x 0.6 cm    Plan: encouraged to check on what med she was on   Will monitor BP   Ob labs today  We discussed YOSELYN and possibility of miscarriage  Sab precautions  Early pregnancy counseling provided and New OB folder given  Patient is taking Prenatal vitamins  Problem list reviewed and updated  Reviewed routine prenatal care with the office to include but not limited to expected weight gain during pregnancy, Tylenol products are fine, avoid ibuprofen;  not to change cat litter; food restrictions; avoidance of alcohol, tobacco, drugs and saunas/hot tubs. Discussed that the COVID  and Flu vaccine is safe and recommended in pregnancy.   SAB warnings reviewed  All questions answered  I spent 30 minutes caring for Nessa Perales on this date of service. This time includes time spent by me in the following activities: preparing for the visit, reviewing tests, obtaining and/or reviewing a separately obtained history, performing a medically appropriate examination and/or evaluation, counseling and educating the patient/family/caregiver, ordering medications, tests, or procedures and documenting information in the medical record.  Return in about 4 weeks (around 5/8/2024).      Desire Yang, APRN  4/10/2024 11:33 EDT

## 2024-04-11 LAB
ABO GROUP BLD: NORMAL
BLD GP AB SCN SERPL QL: NEGATIVE
ERYTHROCYTE [DISTWIDTH] IN BLOOD BY AUTOMATED COUNT: 12.6 % (ref 11.7–15.4)
HBA1C MFR BLD: 5.3 % (ref 4.8–5.6)
HBV SURFACE AG SERPL QL IA: NEGATIVE
HCT VFR BLD AUTO: 41.7 % (ref 34–46.6)
HCV IGG SERPL QL IA: NON REACTIVE
HGB A MFR BLD ELPH: 97.6 % (ref 96.4–98.8)
HGB A2 MFR BLD ELPH: 2.4 % (ref 1.8–3.2)
HGB BLD-MCNC: 13.8 G/DL (ref 11.1–15.9)
HGB F MFR BLD ELPH: 0 % (ref 0–2)
HGB FRACT BLD-IMP: NORMAL
HGB S MFR BLD ELPH: 0 %
HIV 1+2 AB+HIV1 P24 AG SERPL QL IA: NON REACTIVE
MCH RBC QN AUTO: 29.7 PG (ref 26.6–33)
MCHC RBC AUTO-ENTMCNC: 33.1 G/DL (ref 31.5–35.7)
MCV RBC AUTO: 90 FL (ref 79–97)
PLATELET # BLD AUTO: 153 X10E3/UL (ref 150–450)
RBC # BLD AUTO: 4.64 X10E6/UL (ref 3.77–5.28)
RH BLD: POSITIVE
RPR SER QL: NON REACTIVE
RUBV IGG SERPL IA-ACNC: 1.54 INDEX
TSH SERPL DL<=0.005 MIU/L-ACNC: 1.08 UIU/ML (ref 0.45–4.5)
VZV IGG SER IA-ACNC: 3612 INDEX
WBC # BLD AUTO: 8 X10E3/UL (ref 3.4–10.8)

## 2024-04-12 LAB
A VAGINAE DNA VAG QL NAA+PROBE: NORMAL SCORE
BVAB2 DNA VAG QL NAA+PROBE: NORMAL SCORE
C ALBICANS DNA VAG QL NAA+PROBE: NEGATIVE
C GLABRATA DNA VAG QL NAA+PROBE: NEGATIVE
C TRACH DNA VAG QL NAA+PROBE: NEGATIVE
MEGA1 DNA VAG QL NAA+PROBE: NORMAL SCORE
N GONORRHOEA DNA VAG QL NAA+PROBE: NEGATIVE
T VAGINALIS DNA VAG QL NAA+PROBE: NEGATIVE

## 2024-04-22 ENCOUNTER — TELEPHONE (OUTPATIENT)
Dept: OBSTETRICS AND GYNECOLOGY | Age: 29
End: 2024-04-22

## 2024-04-22 PROCEDURE — 99282 EMERGENCY DEPT VISIT SF MDM: CPT

## 2024-04-22 NOTE — TELEPHONE ENCOUNTER
Provider: DR JOSEPH     Caller: EDUAR HENRIQUEZ    Relationship to Patient: SELF     Pharmacy: [facility]    Phone Number: [phone]    Reason for Call: PT STARTED HAVING SOME YELLOW DISCHARGE YESTERDAY -- IT IS A LITTLE ITCHY NO ODOR NO BURNING PT IS CONCERNED PLEASE CALL SHE IS WORRIED IT MAY BE MUCOUS PT HAS HAD SOME MILD CRAMPING

## 2024-04-23 ENCOUNTER — HOSPITAL ENCOUNTER (EMERGENCY)
Facility: HOSPITAL | Age: 29
Discharge: HOME | End: 2024-04-23
Attending: STUDENT IN AN ORGANIZED HEALTH CARE EDUCATION/TRAINING PROGRAM
Payer: COMMERCIAL

## 2024-04-23 VITALS
WEIGHT: 129.41 LBS | SYSTOLIC BLOOD PRESSURE: 121 MMHG | DIASTOLIC BLOOD PRESSURE: 77 MMHG | OXYGEN SATURATION: 100 % | RESPIRATION RATE: 16 BRPM | HEART RATE: 83 BPM | TEMPERATURE: 98.7 F | BODY MASS INDEX: 20.8 KG/M2 | HEIGHT: 66 IN

## 2024-04-23 DIAGNOSIS — O26.891 VAGINAL DISCHARGE DURING PREGNANCY IN FIRST TRIMESTER: Primary | ICD-10-CM

## 2024-04-23 DIAGNOSIS — Z3A.01 LESS THAN 8 WEEKS GESTATION OF PREGNANCY: ICD-10-CM

## 2024-04-23 DIAGNOSIS — N89.8 VAGINAL DISCHARGE DURING PREGNANCY IN FIRST TRIMESTER: Primary | ICD-10-CM

## 2024-04-23 NOTE — DISCHARGE INSTRUCTIONS
Please follow-up with your PCP and OB/GYN    Although you are being discharged in the ED today, I encouraged her to return for worsening symptoms.  Things can, and do, change such a treatment at home with medication may not be adequate.  Specifically I recommend returning for chest pain or discomfort, difficulty breathing, persistent vomiting or difficulty holding down liquids or medications, fever > 102.0 F,  or any other worsening or alarming symptoms.     You have been evaluated in the emergency department for your presenting symptoms and deemed appropriate for discharge home.  Understand that your health care does not end here today.  It is important that your primary care physician be made aware of your visit.  I recommend calling your primary care provider in the next 48 hours to arrange follow-up care.  Your primary care provider needs to review your treatment and recovery to ensure that no further treatment is necessary.  Additional testing or procedures may be necessary as an outpatient at the discretion of your primary care provider.    I also recommend following up with your PCP for recheck of your blood pressure and treatment as needed.

## 2024-04-23 NOTE — ED PROVIDER NOTES
EMERGENCY DEPARTMENT ENCOUNTER  Room Number:  06/06  PCP: Provider, No Known  Independent Historians: Patient and Language translation service      HPI:  Chief Complaint: had concerns including Vaginal Discharge and Pregnancy Problem.     A complete HPI/ROS/PMH/PSH/SH/FH are unobtainable due to: None    Chronic or social conditions impacting patient care (Social Determinants of Health): None      Context: Nessa Perales is a G3, P2 29 y.o. female who is 8 weeks pregnant with no past medical history presents the emergency department with vaginal discharge that began yesterday.  Patient says it was thick white discharge that was abnormal for her.  Patient denies any abdominal pain or vaginal bleeding.   patient has had numerous ultrasounds during this pregnancy and confirmed IUP.  Her next appointment with her OB/GYN is 3 days from now.  Patient denies concern for sexually transmitted infections and was recently tested at her OB/GYN appointment.  She denies dysuria, hematuria, urinary frequency or urgency.  She denies nausea, vomiting or diarrhea.  She denies fever or chills.      Review of prior external notes (non-ED) -and- Review of prior external test results outside of this encounter:   Patient had OB ultrasound on 3/26/2024 which showed a small intrauterine fluid collection which showed an early intrauterine gestational sac approximately 5 weeks.  She also had a small 1.8 cm mixed right adnexal cyst.    Patient had a 4/8/2024 because she was having vaginal bleeding.  She had a live intrauterine fetus which measured 6 weeks and 2 days.  There was a small subchorionic hemorrhage.    Patient had ultrasound on 4/10/2024 which showed intrauterine pregnancy at 7 weeks with fetal heart tones 136    Vaginosis panel was performed on 4/10/2020 for the initial prenatal visit and patient was negative for Candida, trichomonas, chlamydia, and gonorrhea    PAST MEDICAL HISTORY  Active Ambulatory Problems     Diagnosis Date  Noted    No Active Ambulatory Problems     Resolved Ambulatory Problems     Diagnosis Date Noted    No Resolved Ambulatory Problems     Past Medical History:   Diagnosis Date    Preeclampsia          PAST SURGICAL HISTORY  Past Surgical History:   Procedure Laterality Date     SECTION           FAMILY HISTORY  Family History   Problem Relation Age of Onset    Breast cancer Maternal Aunt 43         SOCIAL HISTORY  Social History     Socioeconomic History    Marital status: Single   Tobacco Use    Smoking status: Never    Smokeless tobacco: Never   Vaping Use    Vaping status: Never Used   Substance and Sexual Activity    Alcohol use: Not Currently    Drug use: Never    Sexual activity: Yes     Partners: Male     Birth control/protection: None         ALLERGIES  Patient has no known allergies.      REVIEW OF SYSTEMS  Included in HPI  All systems reviewed and negative except for those discussed in HPI.      PHYSICAL EXAM    I have reviewed the triage vital signs and nursing notes.    ED Triage Vitals   Temp Heart Rate Resp BP SpO2   243 24   98.7 °F (37.1 °C) 95 16 121/70 100 %      Temp src Heart Rate Source Patient Position BP Location FiO2 (%)   24 -- -- --   Tympanic Monitor          Physical Exam  Constitutional:       General: She is not in acute distress.     Appearance: Normal appearance. She is obese.   HENT:      Head: Normocephalic and atraumatic.      Nose: Nose normal.      Mouth/Throat:      Mouth: Mucous membranes are moist.   Eyes:      Extraocular Movements: Extraocular movements intact.      Pupils: Pupils are equal, round, and reactive to light.   Cardiovascular:      Rate and Rhythm: Normal rate and regular rhythm.      Pulses: Normal pulses.      Heart sounds: Normal heart sounds.   Pulmonary:      Effort: Pulmonary effort is normal. No respiratory distress.      Breath sounds: Normal breath sounds.    Abdominal:      General: Abdomen is flat. There is no distension.      Palpations: Abdomen is soft.      Tenderness: There is no abdominal tenderness.   Musculoskeletal:         General: Normal range of motion.      Cervical back: Normal range of motion and neck supple.   Skin:     General: Skin is warm and dry.      Capillary Refill: Capillary refill takes less than 2 seconds.   Neurological:      General: No focal deficit present.      Mental Status: She is alert and oriented to person, place, and time.   Psychiatric:         Mood and Affect: Mood normal.         Behavior: Behavior normal.       FHTs: 150s    OUTPATIENT MEDICATION MANAGEMENT:  No current Epic-ordered facility-administered medications on file.     Current Outpatient Medications Ordered in Epic   Medication Sig Dispense Refill    Prenatal Vit-Fe Fumarate-FA (prenatal vitamin 27-0.8) 27-0.8 MG tablet tablet Take  by mouth Daily.             PROGRESS, DATA ANALYSIS, CONSULTS, AND MEDICAL DECISION MAKING  ORDERS PLACED DURING THIS VISIT:  No orders of the defined types were placed in this encounter.      All labs have been independently interpreted by me.  All radiology studies have been reviewed by me. All EKG's have been independently viewed and interpreted by me.  Discussion below represents my analysis of pertinent findings related to patient's condition, differential diagnosis, treatment plan and final disposition.    Differential diagnosis includes but is not limited to:   Normal discharge of pregnancy, gonorrhea, chlamydia, trichomonas, bacterial vaginosis, yeast infection, UTI    ED Course:  ED Course as of 04/23/24 0430   Tue Apr 23, 2024   0053 I discussed the case with Dr. Camacho and they agree to evaluate the patient at the bedside.    [CC]      ED Course User Index  [CC] Lula Mccormick PA-C           AS OF 04:30 EDT VITALS:    BP - 121/77  HR - 83  TEMP - 98.7 °F (37.1 °C) (Tympanic)  O2 SATS - 100%        MDM:  Patient is a healthy  and well-appearing 29-year-old female who is approximately 8 weeks pregnant presents the emergency department today with vaginal discharge.  On arrival here in the emergency department vitals are reassuring, she is afebrile.  On my exam the patient's abdomen is soft and nontender.  We discussed pelvic exam but patient just recently had a vaginosis panel within the last few weeks which was negative as had new sexual partners.  I suspect that her discharge is just secondary to increased hormone production due to her early pregnancy.  Patient had good fetal heart tones on Doppler.  She has had no bleeding and there is no indication for repeat ultrasound here today.  She has a follow-up appointment already scheduled with OB/GYN in 3 days.  Reassured the patient and she will be discharged home with outpatient follow-up.         DIAGNOSIS  Final diagnoses:   Vaginal discharge during pregnancy in first trimester   Less than 8 weeks gestation of pregnancy         DISPOSITION  ED Disposition       ED Disposition   Discharge    Condition   Stable    Comment   --                      Please note that portions of this document were completed with a voice recognition program.    Note Disclaimer: At [facility], we believe that sharing information builds trust and better relationships. You are receiving this note because you recently visited [facility]. It is possible you will see health information before a provider has talked with you about it. This kind of information can be easy to misunderstand. To help you fully understand what it means for your health, we urge you to discuss this note with your provider.     Lula Mccormick PA-C  04/23/24 0433

## 2024-04-23 NOTE — ED PROVIDER NOTES
MD ATTESTATION NOTE    The LEEANN and I have discussed this patient's history, physical exam, and treatment plan.  I have reviewed the documentation and personally had a face to face interaction with the patient. I affirm the documentation and agree with the treatment and plan.  The attached note describes my personal findings.      I provided a substantive portion of the care of the patient.  I personally performed the physical exam in its entirety, and below are my findings.        Brief HPI: Patient is a  at 8 weeks gestation presented to the emergency department with vaginal discharge.  No pain or bleeding.  No new sexual partners.    PHYSICAL EXAM  ED Triage Vitals   Temp Heart Rate Resp BP SpO2   24   98.7 °F (37.1 °C) 95 16 121/70 100 %      Temp src Heart Rate Source Patient Position BP Location FiO2 (%)   24 -- -- --   Tympanic Monitor            GENERAL: no acute distress  HENT: nares patent  EYES: no scleral icterus  CV: regular rhythm, normal rate  RESPIRATORY: normal effort  ABDOMEN: soft  MUSCULOSKELETAL: no deformity  NEURO: alert, moves all extremities, follows commands  PSYCH:  calm, cooperative  SKIN: warm, dry    Vital signs and nursing notes reviewed.        Plan: Patient presented emergency department with vaginal discharge in pregnancy, otherwise well-appearing, vitals otherwise stable.  Exam reassuring.  She has had an ultrasound which demonstrated IUP.  Recent vaginosis panel which was negative.  Suspect physiologic.  Will discharge with supportive care and continued outpatient follow-up.    ED Course as of 24 1445   Tue 2024   0053 I discussed the case with Dr. Camacho and they agree to evaluate the patient at the bedside.    [CC]      ED Course User Index  [CC] Lula Mccormick, LUIS ALBERTO       SHARED VISIT: This visit was performed by BOTH a physician and an APC. The substantive portion of  the medical decision making was performed by this attesting physician who made or approved the management plan and takes responsibility for patient management. All studies in the APC note (if performed) were independently interpreted by me.        Tommy Camacho MD  04/23/24 8974

## 2024-04-25 ENCOUNTER — OFFICE VISIT (OUTPATIENT)
Dept: OBSTETRICS AND GYNECOLOGY | Age: 29
End: 2024-04-25
Payer: COMMERCIAL

## 2024-04-25 ENCOUNTER — TELEPHONE (OUTPATIENT)
Dept: OBSTETRICS AND GYNECOLOGY | Age: 29
End: 2024-04-25

## 2024-04-25 VITALS — WEIGHT: 129 LBS | HEIGHT: 66 IN | BODY MASS INDEX: 20.73 KG/M2

## 2024-04-25 DIAGNOSIS — O09.299 HISTORY OF PRE-ECLAMPSIA IN PRIOR PREGNANCY, CURRENTLY PREGNANT: ICD-10-CM

## 2024-04-25 DIAGNOSIS — O41.8X11 SUBCHORIONIC HEMATOMA IN FIRST TRIMESTER, FETUS 1 OF MULTIPLE GESTATION: Primary | ICD-10-CM

## 2024-04-25 DIAGNOSIS — O20.0 THREATENED MISCARRIAGE IN EARLY PREGNANCY: ICD-10-CM

## 2024-04-25 DIAGNOSIS — O46.8X1 SUBCHORIONIC HEMATOMA IN FIRST TRIMESTER, FETUS 1 OF MULTIPLE GESTATION: Primary | ICD-10-CM

## 2024-04-25 NOTE — TELEPHONE ENCOUNTER
Provider: DR JOSEPH    Caller: EDUAR HENRIQUEZ    Pharmacy:  PHARMACY    Phone Number: [phone] / LVM    Reason for Call: PRESCRIPTIONS - PT WAS SEEN TODAY 04/25/24 @ 9:30am - PT STATES THAT PRENATAL VITAMINS AND NAUSEA MEDICATION WERE TO BE CALLED INTO THE  PHARMACY - PT STATES THAT SHE IS THERE AND THEY ARE TELLING HER THAT THEY DO NOT HAVE ANY RX'S FOR HER    PLEASE CALL THE PT TO LET HER KNOW WHEN THESE WILL BE CALLED INTO THE PHARMACY    THANK YOU!

## 2024-04-25 NOTE — TELEPHONE ENCOUNTER
Rx for prenatal vitamins and zofran 4 mg called in to the [facility] pharmacy .I spoke with [place].

## 2024-04-25 NOTE — PROGRESS NOTES
Subjective       History of Present Illness  Nessa Perales is a 29 y.o. female is being seen today for threatened miscarriage  Patient was seen in the emergency department for discharge vaginosis panel was negative ultrasound was reaffirming  She had previously had an ultrasound in clinic which was consistent with an WALT of 1127 but there is a small subchorionic hemorrhage  She is no longer having any bleeding or pink or discharge but increased vaginal discharge  In addition to a negative vaginosis panel, no OB lab work is reassuring she is known to be O+    A  was present throughout our examination    Chief Complaint   Patient presents with    Gynecologic Exam     Gyn: early ob with pinkish discharge,had cramping 4 days ago   .        The following portions of the patient's history were reviewed and updated as appropriate: allergies, current medications, past family history, past medical history, past social history, past surgical history and problem list.    PAST MEDICAL HISTORY  Past Medical History:   Diagnosis Date    Preeclampsia      OB History    Para Term  AB Living   4 2 2   1 2   SAB IAB Ectopic Molar Multiple Live Births             2      # Outcome Date GA Lbr Mynor/2nd Weight Sex Type Anes PTL Lv   4 Current            3 Term  40w0d   M CS-LTranv   JOAN   2 Term  40w0d   F Vag-Spont   JOAN   1 AB  5w0d            Past Surgical History:   Procedure Laterality Date     SECTION       Family History   Problem Relation Age of Onset    Breast cancer Maternal Aunt 43     Social History     Tobacco Use   Smoking Status Never   Smokeless Tobacco Never       Current Outpatient Medications:     Prenatal Vit-Fe Fumarate-FA (prenatal vitamin 27-0.8) 27-0.8 MG tablet tablet, Take  by mouth Daily., Disp: , Rfl:     There is no immunization history on file for this patient.    Review of Systems       Except as outlined in history of physical illness, patient denies  any changes in her GYN, , GI systems. All other systems reviewed are negative.    Objective   Physical Exam   Alert and oriented, respirations unlabored, heart regular rate and rhythm   Pelvic external genitalia normal vagina shows some old mucousy pinkish discharge but no active bleeding  Bimanual exam shows uterus approximately 10 weeks size no adnexal enlargement      Assessment & Plan   Diagnoses and all orders for this visit:    1. Subchorionic hematoma in first trimester, fetus 1 of multiple gestation (Primary)    2. History of pre-eclampsia in prior pregnancy, currently pregnant    3. Threatened miscarriage in early pregnancy    Handheld ultrasound showed cardiac activity  Ultrasound in the ED also showed cardiac activity consistent with an WALT of a November 27  Discussed risk of miscarriage patient is O  positive, will stay at pelvic rest has a follow-up visit with us already scheduled and we will make sure we have a repeat ultrasound.  Miscarriage warnings given patient will let us know if she has any in his crease bleeding or cramping.             No orders of the defined types were placed in this encounter.          EMR Dragon/ Transcription disclaimer:  Much of the encounter note is an electronic transcription/translation of spoken language to printed text. The electronic translation of spoken language may permit erroneous, or at times, nonessential words or phrases to be inadvertently transcribes; Although i have reviewed the note for such errors, some may still exist.

## 2024-05-07 ENCOUNTER — INITIAL PRENATAL (OUTPATIENT)
Dept: OBSTETRICS AND GYNECOLOGY | Age: 29
End: 2024-05-07
Payer: COMMERCIAL

## 2024-05-07 VITALS — WEIGHT: 130 LBS | BODY MASS INDEX: 20.98 KG/M2 | DIASTOLIC BLOOD PRESSURE: 64 MMHG | SYSTOLIC BLOOD PRESSURE: 110 MMHG

## 2024-05-07 DIAGNOSIS — Z34.81 PRENATAL CARE, SUBSEQUENT PREGNANCY, FIRST TRIMESTER: Primary | ICD-10-CM

## 2024-05-07 LAB
CLARITY, POC: CLEAR
COLOR UR: YELLOW
GLUCOSE UR STRIP-MCNC: NEGATIVE MG/DL
PROT UR STRIP-MCNC: NEGATIVE MG/DL

## 2024-05-07 PROCEDURE — 99213 OFFICE O/P EST LOW 20 MIN: CPT | Performed by: OBSTETRICS & GYNECOLOGY

## 2024-05-09 PROBLEM — O99.891 ASYMPTOMATIC BACTERIURIA IN PREGNANCY: Status: ACTIVE | Noted: 2024-05-09

## 2024-05-09 PROBLEM — R82.71 ASYMPTOMATIC BACTERIURIA IN PREGNANCY: Status: ACTIVE | Noted: 2024-05-09

## 2024-05-09 LAB
BACTERIA UR CULT: ABNORMAL

## 2024-05-15 LAB
Lab: NEGATIVE
Lab: NORMAL
NTRA ALPHA-THALASSEMIA: NEGATIVE
NTRA BETA-HEMOGLOBINOPATHIES: NEGATIVE
NTRA CANAVAN DISEASE: NEGATIVE
NTRA CYSTIC FIBROSIS: NEGATIVE
NTRA DUCHENNE/BECKER MUSCULAR DYSTROPHY: NEGATIVE
NTRA FAMILIAL DYSAUTONOMIA: NEGATIVE
NTRA FRAGILE X SYNDROME: NEGATIVE
NTRA GALACTOSEMIA: NEGATIVE
NTRA GAUCHER DISEASE: NEGATIVE
NTRA MEDIUM CHAIN ACYL-COA DEHYDROGENASE DEFICIENCY: NEGATIVE
NTRA POLYCYSTIC KIDNEY DISEASE, AUTOSOMAL RECESSIVE: NEGATIVE
NTRA SMITH-LEMLI-OPITZ SYNDROME: NEGATIVE
NTRA SPINAL MUSCULAR ATROPHY: NEGATIVE
NTRA TAY-SACHS DISEASE: NEGATIVE

## 2024-05-16 ENCOUNTER — TELEPHONE (OUTPATIENT)
Dept: OBSTETRICS AND GYNECOLOGY | Age: 29
End: 2024-05-16
Payer: COMMERCIAL

## 2024-05-16 LAB
Lab: NORMAL
NTRA FETAL FRACTION: NORMAL
NTRA GENDER OF FETUS: NORMAL
NTRA MONOSOMY X AGE-BASED RISK TEXT: NORMAL
NTRA MONOSOMY X RESULT TEXT: NORMAL
NTRA MONOSOMY X RISK SCORE TEXT: NORMAL
NTRA TRIPLOIDY RESULT TEXT: NORMAL
NTRA TRISOMY 13 AGE-BASED RISK TEXT: NORMAL
NTRA TRISOMY 13 RESULT TEXT: NORMAL
NTRA TRISOMY 13 RISK SCORE TEXT: NORMAL
NTRA TRISOMY 18 AGE-BASED RISK TEXT: NORMAL
NTRA TRISOMY 18 RESULT TEXT: NORMAL
NTRA TRISOMY 18 RISK SCORE TEXT: NORMAL
NTRA TRISOMY 21 AGE-BASED RISK TEXT: NORMAL
NTRA TRISOMY 21 RESULT TEXT: NORMAL
NTRA TRISOMY 21 RISK SCORE TEXT: NORMAL

## 2024-05-16 NOTE — TELEPHONE ENCOUNTER
Caller: Nessa Perales    Relationship: Self    Best call back number: [phone]    What is the best time to reach you: ANYTIME NEED [place]    Who are you requesting to speak with (clinical staff, provider,  specific staff member): VAISHALI    Do you know the name of the person who called: VAISHALI    What was the call regarding: RESULTS    Is it okay if the provider responds through MyChart: YES

## 2024-05-24 ENCOUNTER — HOSPITAL ENCOUNTER (EMERGENCY)
Facility: HOSPITAL | Age: 29
Discharge: HOME | End: 2024-05-24
Attending: EMERGENCY MEDICINE
Payer: COMMERCIAL

## 2024-05-24 VITALS
WEIGHT: 130.07 LBS | TEMPERATURE: 98.6 F | BODY MASS INDEX: 20.9 KG/M2 | DIASTOLIC BLOOD PRESSURE: 70 MMHG | SYSTOLIC BLOOD PRESSURE: 113 MMHG | HEIGHT: 66 IN | HEART RATE: 73 BPM | OXYGEN SATURATION: 100 % | RESPIRATION RATE: 18 BRPM

## 2024-05-24 DIAGNOSIS — R51.9 ACUTE NONINTRACTABLE HEADACHE, UNSPECIFIED HEADACHE TYPE: Primary | ICD-10-CM

## 2024-05-24 DIAGNOSIS — Z3A.13 13 WEEKS GESTATION OF PREGNANCY: ICD-10-CM

## 2024-05-24 DIAGNOSIS — R03.0 ELEVATED BLOOD PRESSURE READING: ICD-10-CM

## 2024-05-24 LAB
ALBUMIN SERPL-MCNC: 3.8 G/DL (ref 3.5–5.2)
ALBUMIN/GLOB SERPL: 1.5 G/DL
ALP SERPL-CCNC: 30 U/L (ref 39–117)
ALT SERPL W P-5'-P-CCNC: 10 U/L (ref 1–33)
ANION GAP SERPL CALCULATED.3IONS-SCNC: 10.3 MMOL/L (ref 5–15)
AST SERPL-CCNC: 11 U/L (ref 1–32)
BASOPHILS # BLD AUTO: 0.02 10*3/MM3 (ref 0–0.2)
BASOPHILS NFR BLD AUTO: 0.2 % (ref 0–1.5)
BILIRUB SERPL-MCNC: <0.2 MG/DL (ref 0–1.2)
BILIRUB UR QL STRIP: NEGATIVE
BUN SERPL-MCNC: 5 MG/DL (ref 6–20)
BUN/CREAT SERPL: 9.3 (ref 7–25)
CALCIUM SPEC-SCNC: 8.6 MG/DL (ref 8.6–10.5)
CHLORIDE SERPL-SCNC: 105 MMOL/L (ref 98–107)
CLARITY UR: CLEAR
CO2 SERPL-SCNC: 22.7 MMOL/L (ref 22–29)
COLOR UR: YELLOW
CREAT SERPL-MCNC: 0.54 MG/DL (ref 0.57–1)
DEPRECATED RDW RBC AUTO: 39.8 FL (ref 37–54)
EGFRCR SERPLBLD CKD-EPI 2021: 127.2 ML/MIN/1.73
EOSINOPHIL # BLD AUTO: 0.07 10*3/MM3 (ref 0–0.4)
EOSINOPHIL NFR BLD AUTO: 0.9 % (ref 0.3–6.2)
ERYTHROCYTE [DISTWIDTH] IN BLOOD BY AUTOMATED COUNT: 12.4 % (ref 12.3–15.4)
GLOBULIN UR ELPH-MCNC: 2.6 GM/DL
GLUCOSE SERPL-MCNC: 75 MG/DL (ref 65–99)
GLUCOSE UR STRIP-MCNC: NEGATIVE MG/DL
HCT VFR BLD AUTO: 37.5 % (ref 34–46.6)
HGB BLD-MCNC: 12.3 G/DL (ref 12–15.9)
HGB UR QL STRIP.AUTO: NEGATIVE
IMM GRANULOCYTES # BLD AUTO: 0.03 10*3/MM3 (ref 0–0.05)
IMM GRANULOCYTES NFR BLD AUTO: 0.4 % (ref 0–0.5)
KETONES UR QL STRIP: NEGATIVE
LEUKOCYTE ESTERASE UR QL STRIP.AUTO: NEGATIVE
LYMPHOCYTES # BLD AUTO: 2.05 10*3/MM3 (ref 0.7–3.1)
LYMPHOCYTES NFR BLD AUTO: 25.3 % (ref 19.6–45.3)
MCH RBC QN AUTO: 29.1 PG (ref 26.6–33)
MCHC RBC AUTO-ENTMCNC: 32.8 G/DL (ref 31.5–35.7)
MCV RBC AUTO: 88.9 FL (ref 79–97)
MONOCYTES # BLD AUTO: 0.6 10*3/MM3 (ref 0.1–0.9)
MONOCYTES NFR BLD AUTO: 7.4 % (ref 5–12)
NEUTROPHILS NFR BLD AUTO: 5.32 10*3/MM3 (ref 1.7–7)
NEUTROPHILS NFR BLD AUTO: 65.8 % (ref 42.7–76)
NITRITE UR QL STRIP: NEGATIVE
NRBC BLD AUTO-RTO: 0 /100 WBC (ref 0–0.2)
PH UR STRIP.AUTO: 6.5 [PH] (ref 5–8)
PLATELET # BLD AUTO: 133 10*3/MM3 (ref 140–450)
PMV BLD AUTO: 12.2 FL (ref 6–12)
POTASSIUM SERPL-SCNC: 3.5 MMOL/L (ref 3.5–5.2)
PROT SERPL-MCNC: 6.4 G/DL (ref 6–8.5)
PROT UR QL STRIP: NEGATIVE
RBC # BLD AUTO: 4.22 10*6/MM3 (ref 3.77–5.28)
SODIUM SERPL-SCNC: 138 MMOL/L (ref 136–145)
SP GR UR STRIP: <=1.005 (ref 1–1.03)
UROBILINOGEN UR QL STRIP: NORMAL
WBC NRBC COR # BLD AUTO: 8.09 10*3/MM3 (ref 3.4–10.8)

## 2024-05-24 PROCEDURE — 85025 COMPLETE CBC W/AUTO DIFF WBC: CPT | Performed by: EMERGENCY MEDICINE

## 2024-05-24 PROCEDURE — 80053 COMPREHEN METABOLIC PANEL: CPT | Performed by: EMERGENCY MEDICINE

## 2024-05-24 PROCEDURE — 99283 EMERGENCY DEPT VISIT LOW MDM: CPT

## 2024-05-24 PROCEDURE — 81003 URINALYSIS AUTO W/O SCOPE: CPT | Performed by: EMERGENCY MEDICINE

## 2024-05-24 RX ORDER — SODIUM CHLORIDE 0.9 % (FLUSH) 0.9 %
10 SYRINGE (ML) INJECTION AS NEEDED
Status: DISCONTINUED | OUTPATIENT
Start: 2024-05-24 | End: 2024-05-24 | Stop reason: HOSPADM

## 2024-05-24 RX ORDER — ACETAMINOPHEN 500 MG
1000 TABLET ORAL ONCE
Status: COMPLETED | OUTPATIENT
Start: 2024-05-24 | End: 2024-05-24

## 2024-05-24 RX ADMIN — ACETAMINOPHEN 1000 MG: 500 TABLET ORAL at 18:39

## 2024-05-24 NOTE — Clinical Note
[facility] EMERGENCY DEPARTMENT  [address] [facility] [address]  Phone: [phone]    Nessa Perales was seen and treated in our emergency department on 5/24/2024.  She may return to work on 05/25/2024.         Thank you for choosing [facility].    Tang Cabezas MD

## 2024-05-24 NOTE — ED PROVIDER NOTES
EMERGENCY DEPARTMENT ENCOUNTER  Room Number:    PCP: Provider, No Known  Independent Historians: Patient      HPI:  Chief Complaint: had concerns including Hypertension.     A complete HPI/ROS/PMH/PSH/SH/FH are unobtainable due to: Nothing      Context: Nessa Perales is a 29 y.o. female with a medical history of hypertension, preeclampsia with previous pregnancy who presents to the ED c/o acute headache, blurry vision, and high blood pressure.  Patient states that symptoms began at work today around 4 PM.  She states that the blurry vision has now resolved but she continues to have a mild headache.  She denies fever or chills.  She denies history of migraines.  She is currently 13 weeks pregnant.  She states that with her eldest child she did have preeclampsia.  She states that she had been on blood pressure medication previously.  She stopped taking this about 6 months ago because she lost her insurance.  She cannot recall which medication she was taking.  She denies abdominal pain, vaginal bleeding.  No nausea or vomiting.  No shortness of breath.  No chest pain.      Review of prior external notes (non-ED) -and- Review of prior external test results outside of this encounter: I reviewed initial prenatal visit with Dr. Bautista.        PAST MEDICAL HISTORY  Active Ambulatory Problems     Diagnosis Date Noted    Asymptomatic bacteriuria in pregnancy 2024     Resolved Ambulatory Problems     Diagnosis Date Noted    No Resolved Ambulatory Problems     Past Medical History:   Diagnosis Date    Preeclampsia          PAST SURGICAL HISTORY  Past Surgical History:   Procedure Laterality Date     SECTION           FAMILY HISTORY  Family History   Problem Relation Age of Onset    Breast cancer Maternal Aunt 43         SOCIAL HISTORY  Social History     Socioeconomic History    Marital status: Single   Tobacco Use    Smoking status: Never    Smokeless tobacco: Never   Vaping Use    Vaping status: Never Used    Substance and Sexual Activity    Alcohol use: Not Currently    Drug use: Never    Sexual activity: Yes     Partners: Male     Birth control/protection: None         ALLERGIES  Patient has no known allergies.      REVIEW OF SYSTEMS  Review of all 14 systems is negative other than stated in the HPI above.      PHYSICAL EXAM    I have reviewed the triage vital signs and nursing notes.    ED Triage Vitals [05/24/24 1730]   Temp Heart Rate Resp BP SpO2   98.6 °F (37 °C) 86 14 152/95 100 %      Temp src Heart Rate Source Patient Position BP Location FiO2 (%)   -- -- -- -- --         GENERAL: awake and alert, well-appearing, no acute distress  HENT: Normocephalic, atraumatic  EYES: no scleral icterus, EOMI, patient wearing corrective lenses, visual acuity grossly intact, conjunctiva clear bilaterally  CV: regular rhythm, regular rate  RESPIRATORY: normal effort  ABDOMEN: soft, nondistended, nontender throughout  MUSCULOSKELETAL: no deformity  NEURO: alert, moves all extremities, follows commands, cranial nerves II through XII grossly intact, speech fluent and clear  PSYCH: calm, cooperative  SKIN: Warm, dry          LAB RESULTS  Recent Results (from the past 24 hour(s))   Comprehensive Metabolic Panel    Collection Time: 05/24/24  6:29 PM    Specimen: Blood   Result Value Ref Range    Glucose 75 65 - 99 mg/dL    BUN 5 (L) 6 - 20 mg/dL    Creatinine 0.54 (L) 0.57 - 1.00 mg/dL    Sodium 138 136 - 145 mmol/L    Potassium 3.5 3.5 - 5.2 mmol/L    Chloride 105 98 - 107 mmol/L    CO2 22.7 22.0 - 29.0 mmol/L    Calcium 8.6 8.6 - 10.5 mg/dL    Total Protein 6.4 6.0 - 8.5 g/dL    Albumin 3.8 3.5 - 5.2 g/dL    ALT (SGPT) 10 1 - 33 U/L    AST (SGOT) 11 1 - 32 U/L    Alkaline Phosphatase 30 (L) 39 - 117 U/L    Total Bilirubin <0.2 0.0 - 1.2 mg/dL    Globulin 2.6 gm/dL    A/G Ratio 1.5 g/dL    BUN/Creatinine Ratio 9.3 7.0 - 25.0    Anion Gap 10.3 5.0 - 15.0 mmol/L    eGFR 127.2 >60.0 mL/min/1.73   Urinalysis With Microscopic If  Indicated (No Culture) - Urine, Clean Catch    Collection Time: 05/24/24  6:29 PM    Specimen: Urine, Clean Catch   Result Value Ref Range    Color, UA Yellow Yellow, Straw    Appearance, UA Clear Clear    pH, UA 6.5 5.0 - 8.0    Specific Gravity, UA <=1.005 1.005 - 1.030    Glucose, UA Negative Negative    Ketones, UA Negative Negative    Bilirubin, UA Negative Negative    Blood, UA Negative Negative    Protein, UA Negative Negative    Leuk Esterase, UA Negative Negative    Nitrite, UA Negative Negative    Urobilinogen, UA 0.2 E.U./dL 0.2 - 1.0 E.U./dL   CBC Auto Differential    Collection Time: 05/24/24  6:29 PM    Specimen: Blood   Result Value Ref Range    WBC 8.09 3.40 - 10.80 10*3/mm3    RBC 4.22 3.77 - 5.28 10*6/mm3    Hemoglobin 12.3 12.0 - 15.9 g/dL    Hematocrit 37.5 34.0 - 46.6 %    MCV 88.9 79.0 - 97.0 fL    MCH 29.1 26.6 - 33.0 pg    MCHC 32.8 31.5 - 35.7 g/dL    RDW 12.4 12.3 - 15.4 %    RDW-SD 39.8 37.0 - 54.0 fl    MPV 12.2 (H) 6.0 - 12.0 fL    Platelets 133 (L) 140 - 450 10*3/mm3    Neutrophil % 65.8 42.7 - 76.0 %    Lymphocyte % 25.3 19.6 - 45.3 %    Monocyte % 7.4 5.0 - 12.0 %    Eosinophil % 0.9 0.3 - 6.2 %    Basophil % 0.2 0.0 - 1.5 %    Immature Grans % 0.4 0.0 - 0.5 %    Neutrophils, Absolute 5.32 1.70 - 7.00 10*3/mm3    Lymphocytes, Absolute 2.05 0.70 - 3.10 10*3/mm3    Monocytes, Absolute 0.60 0.10 - 0.90 10*3/mm3    Eosinophils, Absolute 0.07 0.00 - 0.40 10*3/mm3    Basophils, Absolute 0.02 0.00 - 0.20 10*3/mm3    Immature Grans, Absolute 0.03 0.00 - 0.05 10*3/mm3    nRBC 0.0 0.0 - 0.2 /100 WBC       The above labs were ordered by me and independently reviewed by me.     RADIOLOGY  No Radiology Exams Resulted Within Past 24 Hours    The above radiology studies were ordered by me.  See ED course for independent interpretations.     MEDICATIONS GIVEN IN ER  Medications   sodium chloride 0.9 % flush 10 mL (has no administration in time range)   acetaminophen (TYLENOL) tablet 1,000 mg (1,000  mg Oral Given 5/24/24 1839)         ORDERS PLACED DURING THIS VISIT:  Orders Placed This Encounter   Procedures    Comprehensive Metabolic Panel    Urinalysis With Microscopic If Indicated (No Culture) - Urine, Clean Catch    CBC Auto Differential    Continuous Pulse Oximetry    Monitor Blood Pressure    Insert Peripheral IV    CBC & Differential         OUTPATIENT MEDICATION MANAGEMENT:  Current Facility-Administered Medications Ordered in Epic   Medication Dose Route Frequency Provider Last Rate Last Admin    sodium chloride 0.9 % flush 10 mL  10 mL Intravenous PRN Tang Cabezas MD         Current Outpatient Medications Ordered in Epic   Medication Sig Dispense Refill    ondansetron (Zofran) 4 MG tablet Take 1 tablet by mouth Every 8 (Eight) Hours As Needed. 20 tablet 3    Prenatal Vit-Fe Fumarate-FA (prenatal vitamin 27-0.8) 27-0.8 MG tablet tablet Take  by mouth Daily.      Prenatal Vit-Fe Fumarate-FA (Prenatal Vitamin) 27-0.8 MG tablet Take 1 tablet by mouth Daily. 30 tablet 12    terconazole (TERAZOL 7) 0.4 % vaginal cream Insert 1 applicator into the vagina Every Night for 7 days 45 g 1         PROCEDURES  Procedures            PROGRESS, DATA ANALYSIS, CONSULTS, AND MEDICAL DECISION MAKING  All labs have been independently interpreted by me.  All radiology studies have been reviewed by me. All EKG's have been independently viewed and interpreted by me.  Discussion below represents my analysis of pertinent findings related to patient's condition, differential diagnosis, treatment plan and final disposition.    Differential diagnosis includes but is not limited to:  Hypertensive urgency  Preeclampsia      Clinical Scores:                  ED Course as of 05/24/24 1909   Fri May 24, 2024   1836 Patient's blood pressure is spontaneously improved to the 120s systolic.  She still has a mild headache but her vision disturbance has resolved.  I informed her plan to obtain labs for further evaluation of  possible preeclampsia.  It sounds like patient has a prior history of high blood pressure that is not currently being medicated. [JR]   1907 Creatinine(!): 0.54 [JR]   1907 AST (SGOT): 11 [JR]   1907 ALT (SGPT): 10 [JR]   1907 Protein, UA: Negative [JR]   1908 Blood pressure spontaneously improved, labs appear reassuring including normal LFTs, no proteinuria.  She has borderline thrombocytopenia.  Patient is appropriate for discharge home at this time with close OB/GYN follow-up for continued monitoring of her blood pressure and labs. [JR]      ED Course User Index  [JR] Tang Cabezas MD             AS OF 19:09 EDT VITALS:    BP - 135/85  HR - 69  TEMP - 98.6 °F (37 °C)  O2 SATS - 100%    COMPLEXITY OF CARE        Chronic or social conditions impacting patient care (Social Determinants of Health):     DIAGNOSIS  Final diagnoses:   Acute nonintractable headache, unspecified headache type   Elevated blood pressure reading   13 weeks gestation of pregnancy           DISPOSITION  DISCHARGE    Patient discharged in stable condition.    Reviewed implications of results, diagnosis, meds, responsibility to follow up, warning signs and symptoms of possible worsening, potential complications and reasons to return to ER.    Patient/Family voiced understanding of above instructions.    Discussed plan for discharge, as there is no emergent indication for admission. Patient referred to primary care provider for BP management due to today's BP. Pt/family is agreeable and understands need for follow up and repeat testing.  Pt is aware that discharge does not mean that nothing is wrong but it indicates no emergency is present that requires admission and they must continue care with follow-up as given below or physician of their choice.     FOLLOW-UP  Link, Eric CHARLES MD  [address] [address] [phone]    Schedule an appointment as soon as possible for a visit            Medication List       No changes were made to your prescriptions during this visit.             Prescription drug monitoring program review:           Please note that portions of this document were completed with a voice recognition program.    Note Disclaimer: At [facility], we believe that sharing information builds trust and better relationships. You are receiving this note because you recently visited [facility]. It is possible you will see health information before a provider has talked with you about it. This kind of information can be easy to misunderstand. To help you fully understand what it means for your health, we urge you to discuss this note with your provider.         Tang Cabezas MD  05/24/24 9660

## 2024-05-29 ENCOUNTER — APPOINTMENT (OUTPATIENT)
Dept: ULTRASOUND IMAGING | Facility: HOSPITAL | Age: 29
End: 2024-05-29
Payer: COMMERCIAL

## 2024-05-29 ENCOUNTER — HOSPITAL ENCOUNTER (EMERGENCY)
Facility: HOSPITAL | Age: 29
Discharge: HOME | End: 2024-05-29
Attending: STUDENT IN AN ORGANIZED HEALTH CARE EDUCATION/TRAINING PROGRAM | Admitting: STUDENT IN AN ORGANIZED HEALTH CARE EDUCATION/TRAINING PROGRAM
Payer: COMMERCIAL

## 2024-05-29 VITALS
HEIGHT: 66 IN | WEIGHT: 130.07 LBS | DIASTOLIC BLOOD PRESSURE: 72 MMHG | TEMPERATURE: 97.6 F | OXYGEN SATURATION: 100 % | RESPIRATION RATE: 15 BRPM | SYSTOLIC BLOOD PRESSURE: 123 MMHG | HEART RATE: 79 BPM | BODY MASS INDEX: 20.9 KG/M2

## 2024-05-29 DIAGNOSIS — O26.892 ABDOMINAL PAIN DURING PREGNANCY IN SECOND TRIMESTER: Primary | ICD-10-CM

## 2024-05-29 DIAGNOSIS — R10.9 ABDOMINAL PAIN DURING PREGNANCY IN SECOND TRIMESTER: Primary | ICD-10-CM

## 2024-05-29 LAB
ALBUMIN SERPL-MCNC: 3.8 G/DL (ref 3.5–5.2)
ALBUMIN/GLOB SERPL: 1.3 G/DL
ALP SERPL-CCNC: 30 U/L (ref 39–117)
ALT SERPL W P-5'-P-CCNC: 8 U/L (ref 1–33)
ANION GAP SERPL CALCULATED.3IONS-SCNC: 10.5 MMOL/L (ref 5–15)
AST SERPL-CCNC: 11 U/L (ref 1–32)
BACTERIA UR QL AUTO: ABNORMAL /HPF
BASOPHILS # BLD AUTO: 0.02 10*3/MM3 (ref 0–0.2)
BASOPHILS NFR BLD AUTO: 0.3 % (ref 0–1.5)
BILIRUB SERPL-MCNC: 0.3 MG/DL (ref 0–1.2)
BILIRUB UR QL STRIP: NEGATIVE
BUN SERPL-MCNC: 5 MG/DL (ref 6–20)
BUN/CREAT SERPL: 10 (ref 7–25)
CALCIUM SPEC-SCNC: 8.6 MG/DL (ref 8.6–10.5)
CHLORIDE SERPL-SCNC: 100 MMOL/L (ref 98–107)
CLARITY UR: CLEAR
CO2 SERPL-SCNC: 24.5 MMOL/L (ref 22–29)
COLOR UR: YELLOW
CREAT SERPL-MCNC: 0.5 MG/DL (ref 0.57–1)
DEPRECATED RDW RBC AUTO: 40.8 FL (ref 37–54)
EGFRCR SERPLBLD CKD-EPI 2021: 129.6 ML/MIN/1.73
EOSINOPHIL # BLD AUTO: 0.07 10*3/MM3 (ref 0–0.4)
EOSINOPHIL NFR BLD AUTO: 0.9 % (ref 0.3–6.2)
ERYTHROCYTE [DISTWIDTH] IN BLOOD BY AUTOMATED COUNT: 12.4 % (ref 12.3–15.4)
GLOBULIN UR ELPH-MCNC: 2.9 GM/DL
GLUCOSE SERPL-MCNC: 90 MG/DL (ref 65–99)
GLUCOSE UR STRIP-MCNC: NEGATIVE MG/DL
HCT VFR BLD AUTO: 38.7 % (ref 34–46.6)
HGB BLD-MCNC: 12.7 G/DL (ref 12–15.9)
HGB UR QL STRIP.AUTO: NEGATIVE
HYALINE CASTS UR QL AUTO: ABNORMAL /LPF
IMM GRANULOCYTES # BLD AUTO: 0.02 10*3/MM3 (ref 0–0.05)
IMM GRANULOCYTES NFR BLD AUTO: 0.3 % (ref 0–0.5)
KETONES UR QL STRIP: NEGATIVE
LEUKOCYTE ESTERASE UR QL STRIP.AUTO: ABNORMAL
LIPASE SERPL-CCNC: 26 U/L (ref 13–60)
LYMPHOCYTES # BLD AUTO: 2.07 10*3/MM3 (ref 0.7–3.1)
LYMPHOCYTES NFR BLD AUTO: 26.9 % (ref 19.6–45.3)
MCH RBC QN AUTO: 29.4 PG (ref 26.6–33)
MCHC RBC AUTO-ENTMCNC: 32.8 G/DL (ref 31.5–35.7)
MCV RBC AUTO: 89.6 FL (ref 79–97)
MONOCYTES # BLD AUTO: 0.53 10*3/MM3 (ref 0.1–0.9)
MONOCYTES NFR BLD AUTO: 6.9 % (ref 5–12)
NEUTROPHILS NFR BLD AUTO: 4.98 10*3/MM3 (ref 1.7–7)
NEUTROPHILS NFR BLD AUTO: 64.7 % (ref 42.7–76)
NITRITE UR QL STRIP: NEGATIVE
PH UR STRIP.AUTO: 6 [PH] (ref 5–8)
PLATELET # BLD AUTO: 129 10*3/MM3 (ref 140–450)
PMV BLD AUTO: 11.9 FL (ref 6–12)
POTASSIUM SERPL-SCNC: 3.6 MMOL/L (ref 3.5–5.2)
PROT SERPL-MCNC: 6.7 G/DL (ref 6–8.5)
PROT UR QL STRIP: NEGATIVE
RBC # BLD AUTO: 4.32 10*6/MM3 (ref 3.77–5.28)
RBC # UR STRIP: ABNORMAL /HPF
REF LAB TEST METHOD: ABNORMAL
SODIUM SERPL-SCNC: 135 MMOL/L (ref 136–145)
SP GR UR STRIP: <=1.005 (ref 1–1.03)
SQUAMOUS #/AREA URNS HPF: ABNORMAL /HPF
UROBILINOGEN UR QL STRIP: ABNORMAL
WBC # UR STRIP: ABNORMAL /HPF
WBC NRBC COR # BLD AUTO: 7.69 10*3/MM3 (ref 3.4–10.8)

## 2024-05-29 PROCEDURE — 80053 COMPREHEN METABOLIC PANEL: CPT | Performed by: STUDENT IN AN ORGANIZED HEALTH CARE EDUCATION/TRAINING PROGRAM

## 2024-05-29 PROCEDURE — 81001 URINALYSIS AUTO W/SCOPE: CPT | Performed by: STUDENT IN AN ORGANIZED HEALTH CARE EDUCATION/TRAINING PROGRAM

## 2024-05-29 PROCEDURE — 36415 COLL VENOUS BLD VENIPUNCTURE: CPT

## 2024-05-29 PROCEDURE — 83690 ASSAY OF LIPASE: CPT | Performed by: STUDENT IN AN ORGANIZED HEALTH CARE EDUCATION/TRAINING PROGRAM

## 2024-05-29 PROCEDURE — 76805 OB US >/= 14 WKS SNGL FETUS: CPT

## 2024-05-29 PROCEDURE — 85025 COMPLETE CBC W/AUTO DIFF WBC: CPT | Performed by: STUDENT IN AN ORGANIZED HEALTH CARE EDUCATION/TRAINING PROGRAM

## 2024-05-29 PROCEDURE — 99284 EMERGENCY DEPT VISIT MOD MDM: CPT

## 2024-05-30 NOTE — ED PROVIDER NOTES
EMERGENCY DEPARTMENT ENCOUNTER  Room Number:    PCP: Provider, No Known  Independent Historians: Patient      HPI:  Chief Complaint: had concerns including Abdominal Pain.       Context: Nessa Perales is a 29 y.o. female who presents to the ED c/o acute abdominal pain.  Pain is located in the left lower quadrant.  Patient is approximately 14 weeks gestation.  Patient denies vaginal bleeding or vaginal discharge.  Patient describes the pain as a sharp sensation.  She states it is worse when she lifts her left leg.      Review of prior external notes (non-ED) -and- Review of prior external test results outside of this encounter: Office visit with OB/GYN from 2024 reviewed and notable for presentation secondary to initial prenatal visit.  Patient underwent ultrasound that demonstrated appropriate fetal heart tone patient was diagnosed with yeast vaginitis plan was to obtain cell free DNA test and follow-up in 1 month    PAST MEDICAL HISTORY  Active Ambulatory Problems     Diagnosis Date Noted    Asymptomatic bacteriuria in pregnancy 2024     Resolved Ambulatory Problems     Diagnosis Date Noted    No Resolved Ambulatory Problems     Past Medical History:   Diagnosis Date    Preeclampsia          PAST SURGICAL HISTORY  Past Surgical History:   Procedure Laterality Date     SECTION           FAMILY HISTORY  Family History   Problem Relation Age of Onset    Breast cancer Maternal Aunt 43         SOCIAL HISTORY  Social History     Socioeconomic History    Marital status: Single   Tobacco Use    Smoking status: Never    Smokeless tobacco: Never   Vaping Use    Vaping status: Never Used   Substance and Sexual Activity    Alcohol use: Not Currently    Drug use: Never    Sexual activity: Yes     Partners: Male     Birth control/protection: None         ALLERGIES  Patient has no known allergies.      REVIEW OF SYSTEMS  Review of Systems  Included in HPI  All systems reviewed and negative except for  those discussed in HPI.      PHYSICAL EXAM    I have reviewed the triage vital signs and nursing notes.    ED Triage Vitals   Temp Heart Rate Resp BP SpO2   05/29/24 1909 05/29/24 1909 05/29/24 1909 05/29/24 1911 05/29/24 1909   97.6 °F (36.4 °C) 94 15 125/85 100 %      Temp src Heart Rate Source Patient Position BP Location FiO2 (%)   -- -- -- -- --              Physical Exam  GENERAL: alert, no acute distress  SKIN: Warm, dry  HENT: Normocephalic, atraumatic  EYES: no scleral icterus  CV: regular rhythm, regular rate  RESPIRATORY: normal effort, lungs clear  ABDOMEN: soft, mild tenderness palpation in the left lower quadrant  MUSCULOSKELETAL: no deformity  NEURO: alert, moves all extremities, follows commands            LAB RESULTS  Recent Results (from the past 24 hour(s))   Urinalysis With Microscopic If Indicated (No Culture) - Urine, Clean Catch    Collection Time: 05/29/24  7:44 PM    Specimen: Urine, Clean Catch   Result Value Ref Range    Color, UA Yellow Yellow, Straw    Appearance, UA Clear Clear    pH, UA 6.0 5.0 - 8.0    Specific Gravity, UA <=1.005 1.005 - 1.030    Glucose, UA Negative Negative    Ketones, UA Negative Negative    Bilirubin, UA Negative Negative    Blood, UA Negative Negative    Protein, UA Negative Negative    Leuk Esterase, UA Trace (A) Negative    Nitrite, UA Negative Negative    Urobilinogen, UA 0.2 E.U./dL 0.2 - 1.0 E.U./dL   Urinalysis, Microscopic Only - Urine, Clean Catch    Collection Time: 05/29/24  7:44 PM    Specimen: Urine, Clean Catch   Result Value Ref Range    RBC, UA 0-2 None Seen, 0-2 /HPF    WBC, UA 3-5 (A) None Seen, 0-2 /HPF    Bacteria, UA Trace (A) None Seen /HPF    Squamous Epithelial Cells, UA 3-6 (A) None Seen, 0-2 /HPF    Hyaline Casts, UA None Seen None Seen /LPF    Methodology Automated Microscopy    Comprehensive Metabolic Panel    Collection Time: 05/29/24  7:48 PM    Specimen: Arm, Left; Blood   Result Value Ref Range    Glucose 90 65 - 99 mg/dL    BUN 5  (L) 6 - 20 mg/dL    Creatinine 0.50 (L) 0.57 - 1.00 mg/dL    Sodium 135 (L) 136 - 145 mmol/L    Potassium 3.6 3.5 - 5.2 mmol/L    Chloride 100 98 - 107 mmol/L    CO2 24.5 22.0 - 29.0 mmol/L    Calcium 8.6 8.6 - 10.5 mg/dL    Total Protein 6.7 6.0 - 8.5 g/dL    Albumin 3.8 3.5 - 5.2 g/dL    ALT (SGPT) 8 1 - 33 U/L    AST (SGOT) 11 1 - 32 U/L    Alkaline Phosphatase 30 (L) 39 - 117 U/L    Total Bilirubin 0.3 0.0 - 1.2 mg/dL    Globulin 2.9 gm/dL    A/G Ratio 1.3 g/dL    BUN/Creatinine Ratio 10.0 7.0 - 25.0    Anion Gap 10.5 5.0 - 15.0 mmol/L    eGFR 129.6 >60.0 mL/min/1.73   Lipase    Collection Time: 05/29/24  7:48 PM    Specimen: Arm, Left; Blood   Result Value Ref Range    Lipase 26 13 - 60 U/L   CBC Auto Differential    Collection Time: 05/29/24  7:48 PM    Specimen: Arm, Left; Blood   Result Value Ref Range    WBC 7.69 3.40 - 10.80 10*3/mm3    RBC 4.32 3.77 - 5.28 10*6/mm3    Hemoglobin 12.7 12.0 - 15.9 g/dL    Hematocrit 38.7 34.0 - 46.6 %    MCV 89.6 79.0 - 97.0 fL    MCH 29.4 26.6 - 33.0 pg    MCHC 32.8 31.5 - 35.7 g/dL    RDW 12.4 12.3 - 15.4 %    RDW-SD 40.8 37.0 - 54.0 fl    MPV 11.9 6.0 - 12.0 fL    Platelets 129 (L) 140 - 450 10*3/mm3    Neutrophil % 64.7 42.7 - 76.0 %    Lymphocyte % 26.9 19.6 - 45.3 %    Monocyte % 6.9 5.0 - 12.0 %    Eosinophil % 0.9 0.3 - 6.2 %    Basophil % 0.3 0.0 - 1.5 %    Immature Grans % 0.3 0.0 - 0.5 %    Neutrophils, Absolute 4.98 1.70 - 7.00 10*3/mm3    Lymphocytes, Absolute 2.07 0.70 - 3.10 10*3/mm3    Monocytes, Absolute 0.53 0.10 - 0.90 10*3/mm3    Eosinophils, Absolute 0.07 0.00 - 0.40 10*3/mm3    Basophils, Absolute 0.02 0.00 - 0.20 10*3/mm3    Immature Grans, Absolute 0.02 0.00 - 0.05 10*3/mm3         RADIOLOGY  US Ob 14 + Weeks Single or First Gestation    Result Date: 5/29/2024  PELVIC ULTRASOUND  HISTORY: Abdominal pain  COMPARISON: May 7, 2024  TECHNIQUE: Grayscale, color Doppler, and spectral Doppler waveform analysis was performed through the pelvis  transabdominally only.  FINDINGS: Single living intrauterine pregnancy is identified. Fetal heart rate was found to be 159 bpm. Fetal movement was noted. Fetal position was variable. Placenta is located anteriorly. Amniotic fluid was identified, although not measured on the current study. The left adnexa was scanned. A structure was identified within the left lower quadrant which may correspond to the left ovary. It measures 3.3 x 2.5 x 2.9 cm. There did appear to be some flow within it.      Single living intrauterine pregnancy is identified. Gestational age by measurements is 14 weeks and 1 day, for an estimated date of delivery of November 26, 2024. Estimated gestational age by dates is 14 weeks and 0 days, for an estimated date of delivery of November 27, 2024. Please note, this examination was not performed as a fetal survey. Continued obstetric and sonographic follow-up is recommended.  This report was finalized on 5/29/2024 9:09 PM by Dr. Elzbieta Finn M.D on Workstation: [id number]         MEDICATIONS GIVEN IN ER  Medications - No data to display      ORDERS PLACED DURING THIS VISIT:  Orders Placed This Encounter   Procedures    US Ob 14 + Weeks Single or First Gestation    Comprehensive Metabolic Panel    Lipase    Urinalysis With Microscopic If Indicated (No Culture) - Urine, Clean Catch    CBC Auto Differential    Urinalysis, Microscopic Only - Urine, Clean Catch    CBC & Differential         OUTPATIENT MEDICATION MANAGEMENT:  No current Epic-ordered facility-administered medications on file.     Current Outpatient Medications Ordered in Epic   Medication Sig Dispense Refill    ondansetron (Zofran) 4 MG tablet Take 1 tablet by mouth Every 8 (Eight) Hours As Needed. 20 tablet 3    Prenatal Vit-Fe Fumarate-FA (prenatal vitamin 27-0.8) 27-0.8 MG tablet tablet Take  by mouth Daily.      Prenatal Vit-Fe Fumarate-FA (Prenatal Vitamin) 27-0.8 MG tablet Take 1 tablet by mouth Daily. 30 tablet 12     terconazole (TERAZOL 7) 0.4 % vaginal cream Insert 1 applicator into the vagina Every Night for 7 days 45 g 1         PROCEDURES  Procedures            PROGRESS, DATA ANALYSIS, CONSULTS, AND MEDICAL DECISION MAKING  All labs have been independently interpreted by me.  All radiology studies have been reviewed by me. All EKG's have been independently viewed and interpreted by me.  Discussion below represents my analysis of pertinent findings related to patient's condition, differential diagnosis, treatment plan and final disposition.    Differential diagnosis includes but is not limited to ovarian torsion, muscle strain, constipation.    Clinical Scores:                   ED Course as of 05/29/24 2214   Wed May 29, 2024   2214 Laboratory evaluation in the emergency department is overall unremarkable.  Ultrasound obtained and demonstrates IUP with good fetal heart tones.  Unclear etiology of patient's discomfort but no evidence of emergent pathology at this time.  Patient does have follow-up with OB/GYN scheduled in 5 days.  Believe patient is appropriate for discharge at this time.  Return precautions provided. [MW]      ED Course User Index  [MW] Ge Haas MD             AS OF 22:14 EDT VITALS:    BP - 123/72  HR - 79  TEMP - 97.6 °F (36.4 °C)  O2 SATS - 100%    COMPLEXITY OF CARE  Admission was considered but after careful review of the patient's presentation, physical examination, diagnostic results, and response to treatment the patient may be safely discharged with outpatient follow-up.      DIAGNOSIS  Final diagnoses:   Abdominal pain during pregnancy in second trimester         DISPOSITION  ED Disposition       ED Disposition   Discharge    Condition   Stable    Comment   --                Please note that portions of this document were completed with a voice recognition program.    Note Disclaimer: At [facility], we believe that sharing information builds trust and better relationships. You are  receiving this note because you recently visited [facility]. It is possible you will see health information before a provider has talked with you about it. This kind of information can be easy to misunderstand. To help you fully understand what it means for your health, we urge you to discuss this note with your provider.         Ge Haas MD  05/29/24 0214

## 2024-05-30 NOTE — DISCHARGE INSTRUCTIONS
Please follow-up with your OB/GYN at your previously scheduled appointment next week    Please return to the ER with new or worsening symptoms including but not limited to severe worsening of abdominal pain, fevers, chills, nausea, vomiting, vaginal bleeding

## 2024-06-04 ENCOUNTER — ROUTINE PRENATAL (OUTPATIENT)
Dept: OBSTETRICS AND GYNECOLOGY | Age: 29
End: 2024-06-04
Payer: COMMERCIAL

## 2024-06-04 VITALS — WEIGHT: 127 LBS | DIASTOLIC BLOOD PRESSURE: 60 MMHG | SYSTOLIC BLOOD PRESSURE: 112 MMHG | BODY MASS INDEX: 20.51 KG/M2

## 2024-06-04 DIAGNOSIS — O99.891 ANTEPARTUM ASYMPTOMATIC BACTERIURIA: ICD-10-CM

## 2024-06-04 DIAGNOSIS — D69.6 LOW PLATELET COUNT: ICD-10-CM

## 2024-06-04 DIAGNOSIS — R82.71 ASYMPTOMATIC BACTERIURIA IN PREGNANCY: ICD-10-CM

## 2024-06-04 DIAGNOSIS — R82.71 ANTEPARTUM ASYMPTOMATIC BACTERIURIA: ICD-10-CM

## 2024-06-04 DIAGNOSIS — Z34.82 PRENATAL CARE, SUBSEQUENT PREGNANCY, SECOND TRIMESTER: Primary | ICD-10-CM

## 2024-06-04 DIAGNOSIS — O99.891 ASYMPTOMATIC BACTERIURIA IN PREGNANCY: ICD-10-CM

## 2024-06-04 PROBLEM — O09.899 PRIOR PREGNANCY COMPLICATED BY PIH, ANTEPARTUM: Status: ACTIVE | Noted: 2024-06-04

## 2024-06-04 PROCEDURE — 99213 OFFICE O/P EST LOW 20 MIN: CPT | Performed by: OBSTETRICS & GYNECOLOGY

## 2024-06-04 RX ORDER — PENICILLIN V POTASSIUM 500 MG/1
500 TABLET ORAL 4 TIMES DAILY
Qty: 20 TABLET | Refills: 0 | Status: SHIPPED | OUTPATIENT
Start: 2024-06-04

## 2024-06-04 RX ORDER — ASPIRIN 81 MG/1
81 TABLET ORAL DAILY
Qty: 90 TABLET | Refills: 2 | Status: SHIPPED | OUTPATIENT
Start: 2024-06-04

## 2024-06-04 NOTE — PROGRESS NOTES
Chief Complaint   Patient presents with    Routine Prenatal Visit     HPI- Pt is 29 y.o.  at 14w6d here for prenatal visit.     ROS-     - No vaginal bleeding    GI- No abdominal pain    /60   Wt 57.6 kg (127 lb)   LMP 2024 (Exact Date)   BMI 20.51 kg/m²   Exam - See flow sheet    Fetal heart rate is normal    Assessment-  Diagnoses and all orders for this visit:    Prenatal care, subsequent pregnancy, second trimester  -     POC Urinalysis Dipstick    Antepartum asymptomatic bacteriuria    Asymptomatic bacteriuria in pregnancy    Low platelet count    Other orders  -     penicillin v potassium (VEETID) 500 MG tablet; Take 1 tablet by mouth 4 (Four) Times a Day.  -     aspirin 81 MG EC tablet; Take 1 tablet by mouth Daily.      Using [employer]  and her daughter Salma who speaks fluent English patient reports she is doing well  Discussed cell free DNA  Discussed her platelet count of 129,000 which will be rechecked later  Discussed baby aspirin for history of PIH  Discussed GBS bacteriuria and treatment with Pen-Vee K prescription has been sent  Discussed anatomy ultrasound in 4 or 5 weeks  Patient had no further questions

## 2024-06-06 ENCOUNTER — TELEPHONE (OUTPATIENT)
Dept: OBSTETRICS AND GYNECOLOGY | Age: 29
End: 2024-06-06
Payer: COMMERCIAL

## 2024-06-06 NOTE — TELEPHONE ENCOUNTER
with patient  on the phone .   Patient GA 15 wks calling c/o having abdominal pain left lower quadrant. .She states it is worse when she lifts her left leg . She was seeing in ER 5/29/24 and felt better for couple days but now the pain is back . Patient denies vaginal bleeding or vaginal discharge. No other complaints , she voiding ok . Please advise ?

## 2024-07-01 ENCOUNTER — TELEPHONE (OUTPATIENT)
Dept: OBSTETRICS AND GYNECOLOGY | Age: 29
End: 2024-07-01
Payer: COMMERCIAL

## 2024-07-01 RX ORDER — FLUCONAZOLE 150 MG/1
150 TABLET ORAL DAILY
Qty: 3 TABLET | Refills: 0 | Status: SHIPPED | OUTPATIENT
Start: 2024-07-01 | End: 2024-07-04

## 2024-07-03 ENCOUNTER — HOSPITAL ENCOUNTER (EMERGENCY)
Facility: HOSPITAL | Age: 29
Discharge: HOME | End: 2024-07-03
Attending: OBSTETRICS & GYNECOLOGY | Admitting: OBSTETRICS & GYNECOLOGY
Payer: COMMERCIAL

## 2024-07-03 VITALS
OXYGEN SATURATION: 100 % | WEIGHT: 131 LBS | RESPIRATION RATE: 16 BRPM | SYSTOLIC BLOOD PRESSURE: 103 MMHG | DIASTOLIC BLOOD PRESSURE: 53 MMHG | BODY MASS INDEX: 21.15 KG/M2 | HEART RATE: 74 BPM

## 2024-07-03 LAB
BACTERIA UR QL AUTO: ABNORMAL /HPF
BILIRUB UR QL STRIP: NEGATIVE
CLARITY UR: CLEAR
COLOR UR: YELLOW
GLUCOSE UR STRIP-MCNC: NEGATIVE MG/DL
HGB UR QL STRIP.AUTO: NEGATIVE
HYALINE CASTS UR QL AUTO: ABNORMAL /LPF
KETONES UR QL STRIP: ABNORMAL
LEUKOCYTE ESTERASE UR QL STRIP.AUTO: ABNORMAL
NITRITE UR QL STRIP: NEGATIVE
PH UR STRIP.AUTO: 6 [PH] (ref 5–8)
PROT UR QL STRIP: NEGATIVE
RBC # UR STRIP: ABNORMAL /HPF
REF LAB TEST METHOD: ABNORMAL
SP GR UR STRIP: 1.01 (ref 1–1.03)
SQUAMOUS #/AREA URNS HPF: ABNORMAL /HPF
UROBILINOGEN UR QL STRIP: ABNORMAL
WBC # UR STRIP: ABNORMAL /HPF

## 2024-07-03 PROCEDURE — 81001 URINALYSIS AUTO W/SCOPE: CPT | Performed by: OBSTETRICS & GYNECOLOGY

## 2024-07-03 PROCEDURE — 87086 URINE CULTURE/COLONY COUNT: CPT | Performed by: OBSTETRICS & GYNECOLOGY

## 2024-07-03 PROCEDURE — 63710000001 ONDANSETRON ODT 4 MG TABLET DISPERSIBLE: Performed by: OBSTETRICS & GYNECOLOGY

## 2024-07-03 PROCEDURE — 99283 EMERGENCY DEPT VISIT LOW MDM: CPT | Performed by: OBSTETRICS & GYNECOLOGY

## 2024-07-03 RX ORDER — ONDANSETRON 4 MG/1
4 TABLET, ORALLY DISINTEGRATING ORAL ONCE
Status: COMPLETED | OUTPATIENT
Start: 2024-07-03 | End: 2024-07-03

## 2024-07-03 RX ORDER — ACETAMINOPHEN 325 MG/1
650 TABLET ORAL ONCE
Status: COMPLETED | OUTPATIENT
Start: 2024-07-03 | End: 2024-07-03

## 2024-07-03 RX ADMIN — ONDANSETRON 4 MG: 4 TABLET, ORALLY DISINTEGRATING ORAL at 18:47

## 2024-07-03 RX ADMIN — ACETAMINOPHEN 325MG 650 MG: 325 TABLET ORAL at 18:47
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
## 2024-07-05 LAB — BACTERIA SPEC AEROBE CULT: NORMAL

## 2024-07-10 ENCOUNTER — ROUTINE PRENATAL (OUTPATIENT)
Dept: OBSTETRICS AND GYNECOLOGY | Age: 29
End: 2024-07-10
Payer: COMMERCIAL

## 2024-07-10 VITALS — SYSTOLIC BLOOD PRESSURE: 110 MMHG | BODY MASS INDEX: 21.32 KG/M2 | DIASTOLIC BLOOD PRESSURE: 60 MMHG | WEIGHT: 132 LBS

## 2024-07-10 DIAGNOSIS — D69.6 LOW PLATELET COUNT: ICD-10-CM

## 2024-07-10 DIAGNOSIS — O09.899 PRIOR PREGNANCY COMPLICATED BY PIH, ANTEPARTUM: ICD-10-CM

## 2024-07-10 DIAGNOSIS — Z34.82 PRENATAL CARE, SUBSEQUENT PREGNANCY, SECOND TRIMESTER: Primary | ICD-10-CM

## 2024-07-10 NOTE — PROGRESS NOTES
Chief Complaint   Patient presents with    Routine Prenatal Visit     Discuss anatomy u/s today     HPI- Pt is 29 y.o.  at 20w0d here for prenatal visit.     ROS-     - No vaginal bleeding    GI- No abdominal pain    /60   Wt 59.9 kg (132 lb)   LMP 2024 (Exact Date)   BMI 21.32 kg/m²   Exam - See flow sheet    Fetal heart rate is normal    Assessment-  Diagnoses and all orders for this visit:    Prenatal care, subsequent pregnancy, second trimester  -     POC Urinalysis Dipstick    Low platelet count    Prior pregnancy complicated by PIH, antepartum      Anatomy ultrasound this morning.  Patient reports good fetal movement has no vaginal bleeding overall doing well

## 2024-07-11 ENCOUNTER — TELEPHONE (OUTPATIENT)
Dept: OBSTETRICS AND GYNECOLOGY | Age: 29
End: 2024-07-11
Payer: COMMERCIAL

## 2024-07-11 NOTE — TELEPHONE ENCOUNTER
20w1d    Pt called in, had to call back needed . Pt was seeing if there was anything she could take to give her more energy? Pt is saying she has no energy to work. I informed pt that I would send message.

## 2024-07-23 ENCOUNTER — TELEPHONE (OUTPATIENT)
Dept: OBSTETRICS AND GYNECOLOGY | Age: 29
End: 2024-07-23
Payer: COMMERCIAL

## 2024-07-23 NOTE — TELEPHONE ENCOUNTER
Pt 21w6d  Nest appt 8/7/24    Spoke to pt about possible yeast infection. She is having symptoms of itching and discharge. Denies and burning. Pt says she had one before. She was prescribed Diflucan on 7/1/24. She is asking if she can be prescribed some again. Please advise.

## 2024-08-07 ENCOUNTER — ROUTINE PRENATAL (OUTPATIENT)
Dept: OBSTETRICS AND GYNECOLOGY | Age: 29
End: 2024-08-07
Payer: COMMERCIAL

## 2024-08-07 VITALS — WEIGHT: 134 LBS | SYSTOLIC BLOOD PRESSURE: 108 MMHG | DIASTOLIC BLOOD PRESSURE: 64 MMHG | BODY MASS INDEX: 21.64 KG/M2

## 2024-08-07 DIAGNOSIS — O09.899 PRIOR PREGNANCY COMPLICATED BY PIH, ANTEPARTUM: ICD-10-CM

## 2024-08-07 DIAGNOSIS — D69.6 LOW PLATELET COUNT: ICD-10-CM

## 2024-08-07 DIAGNOSIS — O99.891 ASYMPTOMATIC BACTERIURIA IN PREGNANCY: ICD-10-CM

## 2024-08-07 DIAGNOSIS — R82.71 ASYMPTOMATIC BACTERIURIA IN PREGNANCY: ICD-10-CM

## 2024-08-07 DIAGNOSIS — Z3A.23 23 WEEKS GESTATION OF PREGNANCY: Primary | ICD-10-CM

## 2024-08-07 DIAGNOSIS — Z34.82 PRENATAL CARE, SUBSEQUENT PREGNANCY, SECOND TRIMESTER: ICD-10-CM

## 2024-08-07 DIAGNOSIS — N89.8 VAGINAL DISCHARGE: ICD-10-CM

## 2024-08-07 LAB
GLUCOSE UR STRIP-MCNC: NEGATIVE MG/DL
PROT UR STRIP-MCNC: NEGATIVE MG/DL

## 2024-08-07 NOTE — PROGRESS NOTES
Chief Complaint   Patient presents with    Routine Prenatal Visit     23 weeks  CC:  no problems       HPI: 29 y.o.  at 24w0d     Doing well  Reports good FM  Denies LOF, bleeding or ctx's  C/o vaginal discharge with itching  Pt of Dr. Bautista    Vitals:    24 0908   BP: 108/64   Weight: 60.8 kg (134 lb)       ROS:  GI:  Negative  : Negative  Pulmonary: Negative     A/P  1. Intrauterine pregnancy at 24w0d   2. Pregnancy Risk:  NORMAL    Diagnoses and all orders for this visit:    1. 23 weeks gestation of pregnancy (Primary)  -     POC Urinalysis Dipstick    2. Asymptomatic bacteriuria in pregnancy    3. Prenatal care, subsequent pregnancy, second trimester    4. Prior pregnancy complicated by PIH, antepartum    5. Low platelet count    6. Vaginal discharge  -     NuSwab VG+ - Swab, Vagina        -----------------------  PLAN:   PTL warnings  Swab sent - will call with results  Hx PIH - continue baby asa  Return in about 4 weeks (around 2024) for ob check, 1 hour gtt Dr Bautista.      Lorraine Kirby, APRN  2024 09:26 EDT   Follow up with Dr. Toribio 2 weeks after the date of your surgery

## 2024-08-10 LAB
A VAGINAE DNA VAG QL NAA+PROBE: ABNORMAL SCORE
BVAB2 DNA VAG QL NAA+PROBE: ABNORMAL SCORE
C ALBICANS DNA VAG QL NAA+PROBE: POSITIVE
C GLABRATA DNA VAG QL NAA+PROBE: NEGATIVE
C TRACH DNA SPEC QL NAA+PROBE: NEGATIVE
MEGA1 DNA VAG QL NAA+PROBE: ABNORMAL SCORE
N GONORRHOEA DNA VAG QL NAA+PROBE: NEGATIVE
T VAGINALIS DNA VAG QL NAA+PROBE: NEGATIVE

## 2024-08-26 ENCOUNTER — HOSPITAL ENCOUNTER (EMERGENCY)
Facility: HOSPITAL | Age: 29
Discharge: HOME | End: 2024-08-26
Attending: OBSTETRICS & GYNECOLOGY | Admitting: OBSTETRICS & GYNECOLOGY
Payer: COMMERCIAL

## 2024-08-26 VITALS
RESPIRATION RATE: 16 BRPM | SYSTOLIC BLOOD PRESSURE: 111 MMHG | BODY MASS INDEX: 23.09 KG/M2 | HEART RATE: 89 BPM | TEMPERATURE: 98.3 F | WEIGHT: 143 LBS | DIASTOLIC BLOOD PRESSURE: 61 MMHG

## 2024-08-26 LAB
A1 MICROGLOB PLACENTAL VAG QL: NEGATIVE
BILIRUB UR QL STRIP: NEGATIVE
CLARITY UR: ABNORMAL
COLOR UR: YELLOW
GLUCOSE UR STRIP-MCNC: NEGATIVE MG/DL
HGB UR QL STRIP.AUTO: NEGATIVE
KETONES UR QL STRIP: ABNORMAL
LEUKOCYTE ESTERASE UR QL STRIP.AUTO: NEGATIVE
NITRITE UR QL STRIP: NEGATIVE
PH UR STRIP.AUTO: 6.5 [PH] (ref 5–8)
PROT UR QL STRIP: NEGATIVE
SP GR UR STRIP: 1.02 (ref 1–1.03)
UROBILINOGEN UR QL STRIP: ABNORMAL

## 2024-08-26 PROCEDURE — 99284 EMERGENCY DEPT VISIT MOD MDM: CPT | Performed by: OBSTETRICS & GYNECOLOGY

## 2024-08-26 PROCEDURE — 81003 URINALYSIS AUTO W/O SCOPE: CPT | Performed by: OBSTETRICS & GYNECOLOGY

## 2024-08-26 PROCEDURE — 87086 URINE CULTURE/COLONY COUNT: CPT | Performed by: OBSTETRICS & GYNECOLOGY

## 2024-08-26 PROCEDURE — 84112 EVAL AMNIOTIC FLUID PROTEIN: CPT | Performed by: OBSTETRICS & GYNECOLOGY

## 2024-08-26 PROCEDURE — 59025 FETAL NON-STRESS TEST: CPT | Performed by: OBSTETRICS & GYNECOLOGY

## 2024-08-26 RX ORDER — ACETAMINOPHEN 500 MG
1000 TABLET ORAL ONCE
Status: COMPLETED | OUTPATIENT
Start: 2024-08-26 | End: 2024-08-26

## 2024-08-26 RX ADMIN — ACETAMINOPHEN 1000 MG: 500 TABLET ORAL at 18:15

## 2024-08-26 NOTE — NURSING NOTE
Pt given discharge instructions using  ID # [id number]. Pt educated on maternal warning signs and to follow up with provider at next scheduled appointment. All pt questions answered. Pt ambulated off unit at 1935

## 2024-08-26 NOTE — OBED NOTES
[facility] Note OB        Patient Name: Nessa Peralse  YOB: 1995  MRN: [id number]  Admission Date: 2024  5:23 PM  Date of Service: 2024    Chief Complaint: Increased mucus ([facility]-patient came in with complaint of increased mucus. Patient denies LOF,, VB, and endorses +FM. Patient reports intermittent contractions that have been happening occasionally over the past 3 weeks. )        Subjective     Nessa Perales is a 29 y.o. female  at 26w5d with Estimated Date of Delivery: 24 who presents with the chief complaint listed above.  She sees Eric Bautista MD for her prenatal care. Her pregnancy has been complicated by:  thrombocytopenia    She describes fetal movement as normal. She denies vaginal bleeding. She is not feeling contractions.          Objective   Patient Active Problem List    Diagnosis     Prenatal care, subsequent pregnancy, second trimester [Z34.82]     Low platelet count [D69.6]     Prior pregnancy complicated by PIH, antepartum [O09.899]     Asymptomatic bacteriuria in pregnancy [O99.891, R82.71]         OB History    Para Term  AB Living   4 2 2 0 1 2   SAB IAB Ectopic Molar Multiple Live Births   0 0 0 0 0 2      # Outcome Date GA Lbr Mynor/2nd Weight Sex Type Anes PTL Lv   4 Current            3 Term  40w0d   M CS-LTranv   JOAN   2 Term  40w0d   F Vag-Spont   JOAN   1 AB  5w0d               Past Medical History:   Diagnosis Date    Postpartum hemorrhage     Preeclampsia        Past Surgical History:   Procedure Laterality Date     SECTION         No current facility-administered medications on file prior to encounter.     Current Outpatient Medications on File Prior to Encounter   Medication Sig Dispense Refill    aspirin 81 MG EC tablet Take 1 tablet by mouth Daily. 90 tablet 2    ondansetron (Zofran) 4 MG tablet Take 1 tablet by mouth Every 8 (Eight) Hours As Needed. 20 tablet 3    Prenatal Vit-Fe Fumarate-FA (Prenatal Vitamin)  27-0.8 MG tablet Take 1 tablet by mouth Daily. 30 tablet 12       No Known Allergies    Family History   Problem Relation Age of Onset    Breast cancer Maternal Aunt 43       Social History     Socioeconomic History    Marital status: Single   Tobacco Use    Smoking status: Never    Smokeless tobacco: Never   Vaping Use    Vaping status: Never Used   Substance and Sexual Activity    Alcohol use: Not Currently    Drug use: Never    Sexual activity: Yes     Partners: Male     Birth control/protection: None           Review of Systems   Constitutional: Negative.    Respiratory: Negative.     Cardiovascular: Negative.    Gastrointestinal: Negative.    Genitourinary: Negative.    Neurological:  Positive for headaches.       PHYSICAL EXAM:      VITAL SIGNS:  Vitals:    08/26/24 1725 08/26/24 1745 08/26/24 1801   BP:  132/75 111/61   Pulse:  91 89   Resp:  16    Temp:  98.3 °F (36.8 °C)    TempSrc:  Oral    Weight: 64.9 kg (143 lb)          FHT:                   Baseline:  130 BPM  Variability:  Moderate = 6 - 25 BPM  Accelerations:  10 x 10 accelerations present     Decelerations:  absent  Contractions:   absent     Interpretation:    Reactive NST, CAT 1 tracing        PHYSICAL EXAM:    General: well developed; well nourished  no acute distress   Heart: Not performed.   Lungs  : breathing is unlabored     Abdomen: soft, non-tender; no masses       Cervix: was checked (by RN): 0 cm / 30 % / -4  Cervical Dilation (cm): 0  Cervical Effacement: 30%  Cervical Consistency: medium  Cervical Position: posterior   Contractions: none        Extremities: peripheral pulses normal, no pedal edema, no clubbing or cyanosis      LABS AND TESTING ORDERED:  Uterine and fetal monitoring  Urinalysis  ROM +    LAB RESULTS:    Recent Results (from the past 24 hour(s))   Urinalysis With Culture If Indicated - Urine, Clean Catch    Collection Time: 08/26/24  6:03 PM    Specimen: Urine, Clean Catch   Result Value Ref Range    Color, UA Yellow  "Yellow, Straw    Appearance, UA Cloudy (A) Clear    pH, UA 6.5 5.0 - 8.0    Specific Gravity, UA 1.019 1.005 - 1.030    Glucose, UA Negative Negative    Ketones, UA 15 mg/dL (1+) (A) Negative    Bilirubin, UA Negative Negative    Blood, UA Negative Negative    Protein, UA Negative Negative    Leuk Esterase, UA Negative Negative    Nitrite, UA Negative Negative    Urobilinogen, UA 0.2 E.U./dL 0.2 - 1.0 E.U./dL   Rapid Assay For ROM - Amniotic Fluid, Amniotic Sac    Collection Time: 24  6:03 PM    Specimen: Amniotic Sac; Amniotic Fluid   Result Value Ref Range    Rupture of Membranes Negative Negative       Lab Results   Component Value Date    ABO O 04/10/2024    RH Positive 04/10/2024       No results found for: \"STREPGPB\"              Assessment & Plan     ASSESSMENT/PLAN:  Nessa Perales is a 29 y.o. female  at 26w5d who presented with:     Mucous discharge, question of leaking fluid        PLAN:     ROM + negative  Patient discharged home with labor precautions  She will keep her scheduled outpatient prenatal appointment.    I have spent 30 minutes including face to face time with the patient, greater than 50% in discussion of the diagnosis (counseling) and/or coordination of care.     Maureen Garcia MD  2024  19:13 EDT  OB Hospitalist  Phone:  [phone]  "

## 2024-08-27 LAB — BACTERIA SPEC AEROBE CULT: NO GROWTH

## 2024-08-31 ENCOUNTER — HOSPITAL ENCOUNTER (INPATIENT)
Facility: HOSPITAL | Age: 29
LOS: 5 days | Discharge: HOME | End: 2024-09-05
Attending: STUDENT IN AN ORGANIZED HEALTH CARE EDUCATION/TRAINING PROGRAM | Admitting: OBSTETRICS & GYNECOLOGY
Payer: COMMERCIAL

## 2024-08-31 ENCOUNTER — ANESTHESIA EVENT (OUTPATIENT)
Dept: LABOR AND DELIVERY | Facility: HOSPITAL | Age: 29
End: 2024-08-31
Payer: COMMERCIAL

## 2024-08-31 ENCOUNTER — ANESTHESIA (OUTPATIENT)
Dept: LABOR AND DELIVERY | Facility: HOSPITAL | Age: 29
End: 2024-08-31
Payer: COMMERCIAL

## 2024-08-31 DIAGNOSIS — Z98.891 S/P CESAREAN SECTION: Primary | ICD-10-CM

## 2024-08-31 PROBLEM — Z34.90 PREGNANCY: Status: ACTIVE | Noted: 2024-08-31

## 2024-08-31 PROBLEM — O99.891 ASYMPTOMATIC BACTERIURIA IN PREGNANCY: Status: RESOLVED | Noted: 2024-05-09 | Resolved: 2024-08-31

## 2024-08-31 PROBLEM — R82.71 ASYMPTOMATIC BACTERIURIA IN PREGNANCY: Status: RESOLVED | Noted: 2024-05-09 | Resolved: 2024-08-31

## 2024-08-31 PROBLEM — O32.8XX0 FOOTLING BREECH PRESENTATION: Status: ACTIVE | Noted: 2024-08-31

## 2024-08-31 LAB
A1 MICROGLOB PLACENTAL VAG QL: NEGATIVE
ABO GROUP BLD: NORMAL
ALBUMIN SERPL-MCNC: 3.6 G/DL (ref 3.5–5.2)
ALBUMIN/GLOB SERPL: 1.1 G/DL
ALP SERPL-CCNC: 60 U/L (ref 39–117)
ALT SERPL W P-5'-P-CCNC: 14 U/L (ref 1–33)
ANION GAP SERPL CALCULATED.3IONS-SCNC: 12.7 MMOL/L (ref 5–15)
AST SERPL-CCNC: 18 U/L (ref 1–32)
ATMOSPHERIC PRESS: 751.1 MMHG
BASE EXCESS BLDCOA CALC-SCNC: -1.9 MMOL/L (ref -2–2)
BASOPHILS # BLD AUTO: 0.03 10*3/MM3 (ref 0–0.2)
BASOPHILS NFR BLD AUTO: 0.2 % (ref 0–1.5)
BDY SITE: ABNORMAL
BILIRUB SERPL-MCNC: 0.5 MG/DL (ref 0–1.2)
BLD GP AB SCN SERPL QL: NEGATIVE
BUN SERPL-MCNC: 4 MG/DL (ref 6–20)
BUN/CREAT SERPL: 8.5 (ref 7–25)
CALCIUM SPEC-SCNC: 8.9 MG/DL (ref 8.6–10.5)
CHLORIDE SERPL-SCNC: 102 MMOL/L (ref 98–107)
CO2 BLDA-SCNC: 23.8 MMOL/L (ref 23–27)
CO2 SERPL-SCNC: 21.3 MMOL/L (ref 22–29)
CREAT SERPL-MCNC: 0.47 MG/DL (ref 0.57–1)
DEPRECATED RDW RBC AUTO: 41.9 FL (ref 37–54)
EGFRCR SERPLBLD CKD-EPI 2021: 131.5 ML/MIN/1.73
EOSINOPHIL # BLD AUTO: 0.02 10*3/MM3 (ref 0–0.4)
EOSINOPHIL NFR BLD AUTO: 0.1 % (ref 0.3–6.2)
ERYTHROCYTE [DISTWIDTH] IN BLOOD BY AUTOMATED COUNT: 12.8 % (ref 12.3–15.4)
GAS FLOW AIRWAY: 2 LPM
GLOBULIN UR ELPH-MCNC: 3.3 GM/DL
GLUCOSE SERPL-MCNC: 88 MG/DL (ref 65–99)
HCO3 BLDCOA-SCNC: 22.6 MMOL/L (ref 22–28)
HCT VFR BLD AUTO: 39.1 % (ref 34–46.6)
HGB BLD-MCNC: 12.9 G/DL (ref 12–15.9)
IMM GRANULOCYTES # BLD AUTO: 0.1 10*3/MM3 (ref 0–0.05)
IMM GRANULOCYTES NFR BLD AUTO: 0.5 % (ref 0–0.5)
LYMPHOCYTES # BLD AUTO: 1.19 10*3/MM3 (ref 0.7–3.1)
LYMPHOCYTES NFR BLD AUTO: 6.2 % (ref 19.6–45.3)
MCH RBC QN AUTO: 29.8 PG (ref 26.6–33)
MCHC RBC AUTO-ENTMCNC: 33 G/DL (ref 31.5–35.7)
MCV RBC AUTO: 90.3 FL (ref 79–97)
MODALITY: ABNORMAL
MONOCYTES # BLD AUTO: 1.31 10*3/MM3 (ref 0.1–0.9)
MONOCYTES NFR BLD AUTO: 6.9 % (ref 5–12)
NEUTROPHILS NFR BLD AUTO: 16.45 10*3/MM3 (ref 1.7–7)
NEUTROPHILS NFR BLD AUTO: 86.1 % (ref 42.7–76)
PCO2 BLDCOA: 37.1 MMHG (ref 43–63)
PH BLDCOA: 7.39 PH UNITS (ref 7.18–7.34)
PLATELET # BLD AUTO: 128 10*3/MM3 (ref 140–450)
PMV BLD AUTO: 12.2 FL (ref 6–12)
PO2 BLDCOA: 25.3 MMHG (ref 12–26)
POTASSIUM SERPL-SCNC: 3.5 MMOL/L (ref 3.5–5.2)
PROT SERPL-MCNC: 6.9 G/DL (ref 6–8.5)
RBC # BLD AUTO: 4.33 10*6/MM3 (ref 3.77–5.28)
RH BLD: POSITIVE
SAO2 % BLDCOA: 46.1 %
SODIUM SERPL-SCNC: 136 MMOL/L (ref 136–145)
T&S EXPIRATION DATE: NORMAL
TREPONEMA PALLIDUM IGG+IGM AB [PRESENCE] IN SERUM OR PLASMA BY IMMUNOASSAY: NORMAL
WBC NRBC COR # BLD AUTO: 19.1 10*3/MM3 (ref 3.4–10.8)

## 2024-08-31 PROCEDURE — 86850 RBC ANTIBODY SCREEN: CPT | Performed by: STUDENT IN AN ORGANIZED HEALTH CARE EDUCATION/TRAINING PROGRAM

## 2024-08-31 PROCEDURE — 86900 BLOOD TYPING SEROLOGIC ABO: CPT | Performed by: STUDENT IN AN ORGANIZED HEALTH CARE EDUCATION/TRAINING PROGRAM

## 2024-08-31 PROCEDURE — 25010000002 ONDANSETRON PER 1 MG: Performed by: STUDENT IN AN ORGANIZED HEALTH CARE EDUCATION/TRAINING PROGRAM

## 2024-08-31 PROCEDURE — 59515 CESAREAN DELIVERY: CPT | Performed by: OBSTETRICS & GYNECOLOGY

## 2024-08-31 PROCEDURE — 25810000003 LACTATED RINGERS PER 1000 ML: Performed by: STUDENT IN AN ORGANIZED HEALTH CARE EDUCATION/TRAINING PROGRAM

## 2024-08-31 PROCEDURE — 25810000003 SODIUM CHLORIDE 0.9 % SOLUTION 1,000 ML FLEX CONT: Performed by: ANESTHESIOLOGY

## 2024-08-31 PROCEDURE — 25010000002 METHYLERGONOVINE MALEATE PER 0.2 MG: Performed by: STUDENT IN AN ORGANIZED HEALTH CARE EDUCATION/TRAINING PROGRAM

## 2024-08-31 PROCEDURE — 25010000002 CEFAZOLIN PER 500 MG: Performed by: OBSTETRICS & GYNECOLOGY

## 2024-08-31 PROCEDURE — 86780 TREPONEMA PALLIDUM: CPT | Performed by: STUDENT IN AN ORGANIZED HEALTH CARE EDUCATION/TRAINING PROGRAM

## 2024-08-31 PROCEDURE — 25810000003 LACTATED RINGERS SOLUTION: Performed by: STUDENT IN AN ORGANIZED HEALTH CARE EDUCATION/TRAINING PROGRAM

## 2024-08-31 PROCEDURE — 88307 TISSUE EXAM BY PATHOLOGIST: CPT

## 2024-08-31 PROCEDURE — 82803 BLOOD GASES ANY COMBINATION: CPT | Performed by: OBSTETRICS & GYNECOLOGY

## 2024-08-31 PROCEDURE — 86901 BLOOD TYPING SEROLOGIC RH(D): CPT | Performed by: STUDENT IN AN ORGANIZED HEALTH CARE EDUCATION/TRAINING PROGRAM

## 2024-08-31 PROCEDURE — 25010000002 BUPIVACAINE PF 0.75 % SOLUTION: Performed by: ANESTHESIOLOGY

## 2024-08-31 PROCEDURE — 25010000002 MORPHINE PER 10 MG: Performed by: ANESTHESIOLOGY

## 2024-08-31 PROCEDURE — 25010000002 AZITHROMYCIN PER 500 MG: Performed by: OBSTETRICS & GYNECOLOGY

## 2024-08-31 PROCEDURE — 25010000002 KETOROLAC TROMETHAMINE PER 15 MG: Performed by: OBSTETRICS & GYNECOLOGY

## 2024-08-31 PROCEDURE — 25010000002 BETAMETHASONE ACET & SOD PHOS PER 4 MG

## 2024-08-31 PROCEDURE — 80053 COMPREHEN METABOLIC PANEL: CPT | Performed by: STUDENT IN AN ORGANIZED HEALTH CARE EDUCATION/TRAINING PROGRAM

## 2024-08-31 PROCEDURE — 84112 EVAL AMNIOTIC FLUID PROTEIN: CPT | Performed by: STUDENT IN AN ORGANIZED HEALTH CARE EDUCATION/TRAINING PROGRAM

## 2024-08-31 PROCEDURE — 85025 COMPLETE CBC W/AUTO DIFF WBC: CPT | Performed by: STUDENT IN AN ORGANIZED HEALTH CARE EDUCATION/TRAINING PROGRAM

## 2024-08-31 PROCEDURE — 25010000002 FENTANYL CITRATE (PF) 50 MCG/ML SOLUTION: Performed by: ANESTHESIOLOGY

## 2024-08-31 PROCEDURE — 25810000003 SODIUM CHLORIDE 0.9 % SOLUTION 250 ML FLEX CONT: Performed by: OBSTETRICS & GYNECOLOGY

## 2024-08-31 PROCEDURE — 25010000002 OXYTOCIN PER 10 UNITS: Performed by: ANESTHESIOLOGY

## 2024-08-31 PROCEDURE — 99202 OFFICE O/P NEW SF 15 MIN: CPT | Performed by: STUDENT IN AN ORGANIZED HEALTH CARE EDUCATION/TRAINING PROGRAM

## 2024-08-31 RX ORDER — TERBUTALINE SULFATE 1 MG/ML
0.25 INJECTION, SOLUTION SUBCUTANEOUS AS NEEDED
Status: DISCONTINUED | OUTPATIENT
Start: 2024-08-31 | End: 2024-08-31 | Stop reason: HOSPADM

## 2024-08-31 RX ORDER — MAGNESIUM SULFATE HEPTAHYDRATE 40 MG/ML
2 INJECTION, SOLUTION INTRAVENOUS CONTINUOUS
Status: DISCONTINUED | OUTPATIENT
Start: 2024-08-31 | End: 2024-08-31

## 2024-08-31 RX ORDER — ONDANSETRON 4 MG/1
4 TABLET, ORALLY DISINTEGRATING ORAL EVERY 8 HOURS PRN
Status: DISCONTINUED | OUTPATIENT
Start: 2024-08-31 | End: 2024-09-05 | Stop reason: HOSPADM

## 2024-08-31 RX ORDER — ONDANSETRON 2 MG/ML
4 INJECTION INTRAMUSCULAR; INTRAVENOUS EVERY 6 HOURS PRN
Status: DISCONTINUED | OUTPATIENT
Start: 2024-08-31 | End: 2024-09-05 | Stop reason: HOSPADM

## 2024-08-31 RX ORDER — ACETAMINOPHEN 325 MG/1
650 TABLET ORAL EVERY 6 HOURS
Status: DISCONTINUED | OUTPATIENT
Start: 2024-09-01 | End: 2024-09-05 | Stop reason: HOSPADM

## 2024-08-31 RX ORDER — LIDOCAINE HYDROCHLORIDE 10 MG/ML
0.5 INJECTION, SOLUTION INFILTRATION; PERINEURAL ONCE AS NEEDED
Status: DISCONTINUED | OUTPATIENT
Start: 2024-08-31 | End: 2024-08-31 | Stop reason: HOSPADM

## 2024-08-31 RX ORDER — BETAMETHASONE SODIUM PHOSPHATE AND BETAMETHASONE ACETATE 3; 3 MG/ML; MG/ML
INJECTION, SUSPENSION INTRA-ARTICULAR; INTRALESIONAL; INTRAMUSCULAR; SOFT TISSUE
Status: COMPLETED
Start: 2024-08-31 | End: 2024-08-31

## 2024-08-31 RX ORDER — SODIUM CHLORIDE 9 MG/ML
40 INJECTION, SOLUTION INTRAVENOUS AS NEEDED
Status: DISCONTINUED | OUTPATIENT
Start: 2024-08-31 | End: 2024-08-31 | Stop reason: HOSPADM

## 2024-08-31 RX ORDER — MISOPROSTOL 200 UG/1
800 TABLET ORAL ONCE AS NEEDED
Status: COMPLETED | OUTPATIENT
Start: 2024-08-31 | End: 2024-08-31

## 2024-08-31 RX ORDER — ONDANSETRON 2 MG/ML
4 INJECTION INTRAMUSCULAR; INTRAVENOUS ONCE AS NEEDED
Status: CANCELLED | OUTPATIENT
Start: 2024-08-31

## 2024-08-31 RX ORDER — FAMOTIDINE 10 MG/ML
20 INJECTION, SOLUTION INTRAVENOUS ONCE AS NEEDED
Status: CANCELLED | OUTPATIENT
Start: 2024-08-31

## 2024-08-31 RX ORDER — SIMETHICONE 80 MG
80 TABLET,CHEWABLE ORAL 4 TIMES DAILY PRN
Status: DISCONTINUED | OUTPATIENT
Start: 2024-08-31 | End: 2024-09-05 | Stop reason: HOSPADM

## 2024-08-31 RX ORDER — BETAMETHASONE SODIUM PHOSPHATE AND BETAMETHASONE ACETATE 3; 3 MG/ML; MG/ML
12 INJECTION, SUSPENSION INTRA-ARTICULAR; INTRALESIONAL; INTRAMUSCULAR; SOFT TISSUE EVERY 24 HOURS
Status: DISCONTINUED | OUTPATIENT
Start: 2024-08-31 | End: 2024-08-31

## 2024-08-31 RX ORDER — HYDROXYZINE HYDROCHLORIDE 50 MG/1
50 TABLET, FILM COATED ORAL EVERY 6 HOURS PRN
Status: DISCONTINUED | OUTPATIENT
Start: 2024-08-31 | End: 2024-09-05 | Stop reason: HOSPADM

## 2024-08-31 RX ORDER — MAGNESIUM CARB/ALUMINUM HYDROX 105-160MG
30 TABLET,CHEWABLE ORAL ONCE AS NEEDED
Status: DISCONTINUED | OUTPATIENT
Start: 2024-08-31 | End: 2024-08-31 | Stop reason: HOSPADM

## 2024-08-31 RX ORDER — ONDANSETRON 4 MG/1
4 TABLET, ORALLY DISINTEGRATING ORAL EVERY 6 HOURS PRN
Status: DISCONTINUED | OUTPATIENT
Start: 2024-08-31 | End: 2024-08-31 | Stop reason: HOSPADM

## 2024-08-31 RX ORDER — SODIUM CHLORIDE 0.9 % (FLUSH) 0.9 %
10 SYRINGE (ML) INJECTION EVERY 12 HOURS SCHEDULED
Status: DISCONTINUED | OUTPATIENT
Start: 2024-08-31 | End: 2024-08-31 | Stop reason: HOSPADM

## 2024-08-31 RX ORDER — ACETAMINOPHEN 500 MG
1000 TABLET ORAL EVERY 6 HOURS
Status: COMPLETED | OUTPATIENT
Start: 2024-08-31 | End: 2024-09-01

## 2024-08-31 RX ORDER — BUPIVACAINE HYDROCHLORIDE 7.5 MG/ML
INJECTION, SOLUTION EPIDURAL; RETROBULBAR
Status: COMPLETED | OUTPATIENT
Start: 2024-08-31 | End: 2024-08-31

## 2024-08-31 RX ORDER — FAMOTIDINE 10 MG/ML
20 INJECTION, SOLUTION INTRAVENOUS 2 TIMES DAILY PRN
Status: DISCONTINUED | OUTPATIENT
Start: 2024-08-31 | End: 2024-08-31 | Stop reason: HOSPADM

## 2024-08-31 RX ORDER — FAMOTIDINE 20 MG/1
20 TABLET, FILM COATED ORAL 2 TIMES DAILY PRN
Status: DISCONTINUED | OUTPATIENT
Start: 2024-08-31 | End: 2024-08-31 | Stop reason: HOSPADM

## 2024-08-31 RX ORDER — OXYCODONE HYDROCHLORIDE 10 MG/1
10 TABLET ORAL EVERY 4 HOURS PRN
Status: DISPENSED | OUTPATIENT
Start: 2024-08-31 | End: 2024-09-05

## 2024-08-31 RX ORDER — OXYTOCIN/0.9 % SODIUM CHLORIDE 30/500 ML
125 PLASTIC BAG, INJECTION (ML) INTRAVENOUS ONCE AS NEEDED
Status: COMPLETED | OUTPATIENT
Start: 2024-08-31 | End: 2024-08-31

## 2024-08-31 RX ORDER — FENTANYL CITRATE 50 UG/ML
INJECTION, SOLUTION INTRAMUSCULAR; INTRAVENOUS
Status: COMPLETED | OUTPATIENT
Start: 2024-08-31 | End: 2024-08-31

## 2024-08-31 RX ORDER — CARBOPROST TROMETHAMINE 250 UG/ML
250 INJECTION, SOLUTION INTRAMUSCULAR
Status: DISCONTINUED | OUTPATIENT
Start: 2024-08-31 | End: 2024-08-31 | Stop reason: HOSPADM

## 2024-08-31 RX ORDER — ACETAMINOPHEN 325 MG/1
650 TABLET ORAL EVERY 4 HOURS PRN
Status: DISCONTINUED | OUTPATIENT
Start: 2024-08-31 | End: 2024-08-31 | Stop reason: HOSPADM

## 2024-08-31 RX ORDER — OXYTOCIN/0.9 % SODIUM CHLORIDE 30/500 ML
250 PLASTIC BAG, INJECTION (ML) INTRAVENOUS CONTINUOUS
Status: DISPENSED | OUTPATIENT
Start: 2024-08-31 | End: 2024-08-31

## 2024-08-31 RX ORDER — KETOROLAC TROMETHAMINE 15 MG/ML
15 INJECTION, SOLUTION INTRAMUSCULAR; INTRAVENOUS EVERY 6 HOURS
Status: COMPLETED | OUTPATIENT
Start: 2024-08-31 | End: 2024-09-01

## 2024-08-31 RX ORDER — CITRIC ACID/SODIUM CITRATE 334-500MG
30 SOLUTION, ORAL ORAL ONCE
Status: CANCELLED | OUTPATIENT
Start: 2024-08-31 | End: 2024-08-31

## 2024-08-31 RX ORDER — IBUPROFEN 600 MG/1
600 TABLET, FILM COATED ORAL EVERY 6 HOURS
Status: DISCONTINUED | OUTPATIENT
Start: 2024-09-01 | End: 2024-09-05 | Stop reason: HOSPADM

## 2024-08-31 RX ORDER — OXYCODONE HYDROCHLORIDE 5 MG/1
5 TABLET ORAL EVERY 4 HOURS PRN
Status: DISPENSED | OUTPATIENT
Start: 2024-08-31 | End: 2024-09-05

## 2024-08-31 RX ORDER — OXYTOCIN/0.9 % SODIUM CHLORIDE 30/500 ML
999 PLASTIC BAG, INJECTION (ML) INTRAVENOUS ONCE
Status: DISCONTINUED | OUTPATIENT
Start: 2024-08-31 | End: 2024-08-31 | Stop reason: HOSPADM

## 2024-08-31 RX ORDER — PHENYLEPHRINE HCL IN 0.9% NACL 1 MG/10 ML
SYRINGE (ML) INTRAVENOUS AS NEEDED
Status: DISCONTINUED | OUTPATIENT
Start: 2024-08-31 | End: 2024-08-31 | Stop reason: SURG

## 2024-08-31 RX ORDER — SODIUM CHLORIDE, SODIUM LACTATE, POTASSIUM CHLORIDE, CALCIUM CHLORIDE 600; 310; 30; 20 MG/100ML; MG/100ML; MG/100ML; MG/100ML
125 INJECTION, SOLUTION INTRAVENOUS CONTINUOUS
Status: DISCONTINUED | OUTPATIENT
Start: 2024-08-31 | End: 2024-08-31

## 2024-08-31 RX ORDER — METHYLERGONOVINE MALEATE 0.2 MG/ML
200 INJECTION INTRAVENOUS ONCE AS NEEDED
Status: COMPLETED | OUTPATIENT
Start: 2024-08-31 | End: 2024-08-31

## 2024-08-31 RX ORDER — HYDROCORTISONE 25 MG/G
CREAM TOPICAL 3 TIMES DAILY PRN
Status: DISCONTINUED | OUTPATIENT
Start: 2024-08-31 | End: 2024-09-05 | Stop reason: HOSPADM

## 2024-08-31 RX ORDER — ONDANSETRON 2 MG/ML
4 INJECTION INTRAMUSCULAR; INTRAVENOUS EVERY 6 HOURS PRN
Status: DISCONTINUED | OUTPATIENT
Start: 2024-08-31 | End: 2024-08-31 | Stop reason: HOSPADM

## 2024-08-31 RX ORDER — TRANEXAMIC ACID 10 MG/ML
1000 INJECTION, SOLUTION INTRAVENOUS ONCE AS NEEDED
Status: DISCONTINUED | OUTPATIENT
Start: 2024-08-31 | End: 2024-08-31

## 2024-08-31 RX ORDER — SODIUM CHLORIDE 0.9 % (FLUSH) 0.9 %
10 SYRINGE (ML) INJECTION AS NEEDED
Status: DISCONTINUED | OUTPATIENT
Start: 2024-08-31 | End: 2024-08-31 | Stop reason: HOSPADM

## 2024-08-31 RX ORDER — MORPHINE SULFATE 4 MG/ML
INJECTION, SOLUTION INTRAMUSCULAR; INTRAVENOUS
Status: COMPLETED | OUTPATIENT
Start: 2024-08-31 | End: 2024-08-31

## 2024-08-31 RX ADMIN — SODIUM CHLORIDE, POTASSIUM CHLORIDE, SODIUM LACTATE AND CALCIUM CHLORIDE: 600; 310; 30; 20 INJECTION, SOLUTION INTRAVENOUS at 10:03

## 2024-08-31 RX ADMIN — FENTANYL CITRATE 20 MCG: 50 INJECTION, SOLUTION INTRAMUSCULAR; INTRAVENOUS at 10:03

## 2024-08-31 RX ADMIN — MISOPROSTOL 400 MCG: 200 TABLET ORAL at 10:20

## 2024-08-31 RX ADMIN — OXYCODONE HYDROCHLORIDE 5 MG: 5 TABLET ORAL at 14:21

## 2024-08-31 RX ADMIN — KETOROLAC TROMETHAMINE 15 MG: 15 INJECTION, SOLUTION INTRAMUSCULAR; INTRAVENOUS at 18:33

## 2024-08-31 RX ADMIN — ACETAMINOPHEN 325MG 650 MG: 325 TABLET ORAL at 09:20

## 2024-08-31 RX ADMIN — MORPHINE SULFATE 200 MCG: 4 INJECTION, SOLUTION INTRAMUSCULAR; INTRAVENOUS at 10:03

## 2024-08-31 RX ADMIN — AZITHROMYCIN MONOHYDRATE 500 MG: 500 INJECTION, POWDER, LYOPHILIZED, FOR SOLUTION INTRAVENOUS at 10:13

## 2024-08-31 RX ADMIN — Medication 100 MCG: at 10:11

## 2024-08-31 RX ADMIN — SODIUM CHLORIDE, POTASSIUM CHLORIDE, SODIUM LACTATE AND CALCIUM CHLORIDE 1000 ML: 600; 310; 30; 20 INJECTION, SOLUTION INTRAVENOUS at 09:07

## 2024-08-31 RX ADMIN — ACETAMINOPHEN 1000 MG: 500 TABLET ORAL at 22:07

## 2024-08-31 RX ADMIN — SODIUM CHLORIDE 2000 MG: 900 INJECTION INTRAVENOUS at 09:44

## 2024-08-31 RX ADMIN — Medication 100 MCG: at 10:18

## 2024-08-31 RX ADMIN — ONDANSETRON 4 MG: 2 INJECTION, SOLUTION INTRAMUSCULAR; INTRAVENOUS at 09:18

## 2024-08-31 RX ADMIN — BUPIVACAINE HYDROCHLORIDE 1.6 ML: 7.5 INJECTION, SOLUTION EPIDURAL; RETROBULBAR at 10:03

## 2024-08-31 RX ADMIN — ACETAMINOPHEN 1000 MG: 500 TABLET ORAL at 15:46

## 2024-08-31 RX ADMIN — METHYLERGONOVINE MALEATE 200 MCG: 0.2 INJECTION, SOLUTION INTRAMUSCULAR; INTRAVENOUS at 11:02

## 2024-08-31 RX ADMIN — OXYTOCIN 30 MILLI-UNITS: 10 INJECTION INTRAVENOUS at 10:19

## 2024-08-31 RX ADMIN — BETAMETHASONE ACETATE AND BETAMETHASONE SODIUM PHOSPHATE 12 MG: 3; 3 INJECTION, SUSPENSION INTRA-ARTICULAR; INTRALESIONAL; INTRAMUSCULAR; SOFT TISSUE at 09:06

## 2024-08-31 RX ADMIN — Medication 125 ML/HR: at 11:42

## 2024-08-31 RX ADMIN — Medication 200 MCG: at 10:26

## 2024-08-31 RX ADMIN — MISOPROSTOL 400 MCG: 200 TABLET ORAL at 10:50

## 2024-08-31 RX ADMIN — FAMOTIDINE 20 MG: 10 INJECTION INTRAVENOUS at 09:18

## 2024-08-31 RX ADMIN — KETOROLAC TROMETHAMINE 15 MG: 15 INJECTION, SOLUTION INTRAMUSCULAR; INTRAVENOUS at 11:45

## 2024-08-31 RX ADMIN — Medication 100 MCG: at 10:05

## 2024-08-31 RX ADMIN — BETAMETHASONE SODIUM PHOSPHATE AND BETAMETHASONE ACETATE 12 MG: 3; 3 INJECTION, SUSPENSION INTRA-ARTICULAR; INTRALESIONAL; INTRAMUSCULAR; SOFT TISSUE at 09:06

## 2024-08-31 NOTE — OP NOTE
[facility]   Obstetrics and Gynecology     2024    Patient:Nessa Perales   MR#:[id number]     Section Procedure Note    Indications: malpresentation: footling breech and PPROM    Pre-operative Diagnosis:   Intrauterine pregnancy at 27w3d  Labor status: Spontaneous Onset of Labor     Pregnancy    Previous  section    Footling breech presentation     premature rupture of membranes (PPROM) with onset of labor within 24 hours of rupture in third trimester, antepartum    S/P  section      Post-operative Diagnosis: same    Procedure:  Low transverse  section     Surgeon: Sukumar Garvin MD     Assistants:  Dr. Morris    Anesthesia: Spinal anesthesia    Prenatal care problem list:  Patient Active Problem List   Diagnosis    Prenatal care, subsequent pregnancy, second trimester    Low platelet count    Prior pregnancy complicated by PIH, antepartum    Pregnancy    Previous  section    Footling breech presentation     premature rupture of membranes (PPROM) with onset of labor within 24 hours of rupture in third trimester, antepartum    S/P  section       Procedure Details   The patient was seen in the LDR preoperatively. The risks, benefits, complications, treatment options, and expected outcomes were discussed with the patient.  The patient concurred with the proposed plan, giving informed consent.  The site of surgery is discussed. The patient was taken to Operating Room # 1, identified as Nessa Perales and the procedure verified as  Delivery. A Time Out was held and the above information confirmed.    After induction of anesthesia, the patient was draped and prepped in the usual sterile manner. A Pfannenstiel incision was made and carried down through the subcutaneous tissue to the fascia. Fascial incision was made and extended transversely. The fascia was  from the underlying rectus tissue superiorly and inferiorly.  The peritoneum was identified and entered. Peritoneal incision was extended longitudinally.     The lower uterine segment was developed adequately precluding the need for a classical incision.  The utero-vesical peritoneal reflection was incised transversely and the bladder flap was bluntly freed from the lower uterine segment. A low transverse uterine incision was made. Delivered using standard breech extraction technique from footling presentation was a female  fetus 1170 g (2 lb 9.3 oz)  with Apgar scores of 3 at one minute and 7 at five minutes. After the umbilical cord was clamped and cut cord blood was obtained for evaluation. The placenta was removed intact and appeared normal. The uterine outline, tubes and ovaries appeared normal.  There was moderate uterine atony without excessive bleeding for which 400 mcg of Cytotec was placed in the uterine fundus.  The uterine incision was closed with running locked sutures of 0 monocryl. Hemostasis was observed. Lavage was carried out until clear.     Peritoneum was closed with 2-0 Vicryl in a running fashion incorporating the muscle in the closure    The fascia was then reapproximated with running sutures of 0 Vicryl. The deep subcutaneous layer was reapproximated with 3-0 Vicryl in a running fashion. The skin was reapproximated with 3-0 monocryl in a subcuticular fashion.     Instrument, sponge, and needle counts were correct prior the abdominal closure and at the conclusion of the case.      Dr. Morris was responsible for performing the following activities: Retraction, Suction, Irrigation, Suturing, Closing, and Delivery of Fetus and their skilled assistance was necessary for the success of this case.      Findings:   viable female fetus footling breech position    Estimated Blood Loss:   600 cc    Calculated Blood Loss:  Quantitative Blood Loss (mL): 757 mL           Specimens:  Placenta            Complications:  None; patient tolerated the procedure  "well.           Disposition: PACU - hemodynamically stable.           Condition: stable    Attending Attestation: I was present and scrubbed for the entire procedure.    Cord gases:  No results found for: \"PHCVEN\", \"BECVEN\"    Sukumar Garvin MD  8/31/2024   16:06 EDT  "

## 2024-08-31 NOTE — ANESTHESIA PREPROCEDURE EVALUATION
Anesthesia Evaluation     Patient summary reviewed   no history of anesthetic complications:   NPO Solid Status: > 8 hours  NPO Liquid Status: > 2 hours           Airway   Mallampati: II  TM distance: >3 FB  Neck ROM: full  No difficulty expected  Dental - normal exam     Pulmonary     breath sounds clear to auscultation  (-) asthma, shortness of breath, recent URI, not a smoker  Cardiovascular   Exercise tolerance: good (4-7 METS)    Rhythm: regular  Rate: normal    (+) hypertension  (-) past MI, dysrhythmias, angina      Neuro/Psych- negative ROS  (-) seizures, CVA  GI/Hepatic/Renal/Endo    (-)  obesity, GERD, liver disease, no renal disease, diabetes    Musculoskeletal     (-) neck stiffness  Abdominal    Substance History      OB/GYN    (+) Pregnant, Preeclampsia, pregnancy induced hypertension    Comment: 27w3d ruptured; Prvs h/o postpartum hemorrhage      Other      Blood dyscrasia: Hb 12.9, Plt 128.              Anesthesia Plan    ASA 2 - emergent     spinal     (IUP 27w3d)    Anesthetic plan, risks, benefits, and alternatives have been provided, discussed and informed consent has been obtained with: patient.    Use of blood products discussed with patient  Consented to blood products.      CODE STATUS:    Level Of Support Discussed With: Patient  Code Status (Patient has no pulse and is not breathing): CPR (Attempt to Resuscitate)  Medical Interventions (Patient has pulse or is breathing): Full Support

## 2024-08-31 NOTE — H&P
[facility]   Obstetrics and Gynecology   History & Physical    2024    Patient: Nessa Perales          MR#:[id number]    Chief Complaint   Patient presents with    Contractions     MEAGHAN-patient came in with complaints of contractions and LOF that started at 0730. Patient reports +FM and denies VB.         Subjective     29 y.o. female  at 27w3d presents to labor and delivery complaining of severe colicky lower abdominal pain onset early this morning.  The patient is closely ruptured on exam with a fetus in footling breech position.    They patient has a history of prior  section.        Patient Active Problem List   Diagnosis    Prenatal care, subsequent pregnancy, second trimester    Low platelet count    Prior pregnancy complicated by PIH, antepartum    Pregnancy    Previous  section    Footling breech presentation     premature rupture of membranes (PPROM) with onset of labor within 24 hours of rupture in third trimester, antepartum       Past Medical History:   Diagnosis Date    Postpartum hemorrhage     Preeclampsia        Past Surgical History:   Procedure Laterality Date     SECTION         Obstetric History:  OB History          4    Para   2    Term   2            AB   1    Living   2         SAB        IAB        Ectopic        Molar        Multiple        Live Births   2               Menstrual History:     Patient's last menstrual period was 2024 (exact date).       # 1 - Date: , Sex: None, Weight: None, GA: 5w0d, Type: None, Apgar1: None, Apgar5: None, Living: None, Birth Comments: None    # 2 - Date: , Sex: Female, Weight: None, GA: 40w0d, Type: Vaginal, Spontaneous, Apgar1: None, Apgar5: None, Living: Living, Birth Comments: None    # 3 - Date: , Sex: Male, Weight: None, GA: 40w0d, Type: , Low Transverse, Apgar1: None, Apgar5: None, Living: Living, Birth Comments: None    # 4 - Date: None, Sex: None,  Weight: None, GA: None, Type: None, Apgar1: None, Apgar5: None, Living: None, Birth Comments: None      Prenatal Information:  Prenatal Results       Initial Prenatal Labs       Test Value Reference Range Date Time    Hemoglobin  12.7 g/dL 12.0 - 15.9 05/29/24 1948       12.3 g/dL 12.0 - 15.9 05/24/24 1829       13.8 g/dL 11.1 - 15.9 04/10/24 1131       12.7 g/dL 12.0 - 15.9 04/08/24 2305       13.2 g/dL 12.0 - 15.9 03/26/24 1840    Hematocrit  38.7 % 34.0 - 46.6 05/29/24 1948       37.5 % 34.0 - 46.6 05/24/24 1829       41.7 % 34.0 - 46.6 04/10/24 1131       38.5 % 34.0 - 46.6 04/08/24 2305       40.8 % 34.0 - 46.6 03/26/24 1840    Platelets  129 10*3/mm3 140 - 450 05/29/24 1948       133 10*3/mm3 140 - 450 05/24/24 1829       153 x10E3/uL 150 - 450 04/10/24 1131       137 10*3/mm3 140 - 450 04/08/24 2305       164 10*3/mm3 140 - 450 03/26/24 1840    Rubella IgG  1.54 index Immune >0.99 04/10/24 1131    Hepatitis B SAg  Negative  Negative 04/10/24 1131    Hepatitis C Ab  Non Reactive  Non Reactive 04/10/24 1131    RPR  Non Reactive  Non Reactive 04/10/24 1131    T. Pallidum Ab         ABO  O   04/10/24 1131    Rh  Positive   04/10/24 1131    Antibody Screen  Negative  Negative 04/10/24 1131       Negative   04/08/24 2305    HIV  Non Reactive  Non Reactive 04/10/24 1131    Urine Culture  No growth   08/26/24 1803       50,000 CFU/mL Normal Urogenital Lotus   07/03/24 1838       Final report   05/07/24 1311    Gonorrhea  Negative  Negative 08/07/24 0939       Negative  Negative 04/10/24 1441    Chlamydia  Negative  Negative 08/07/24 0939       Negative  Negative 04/10/24 1441    TSH  1.080 uIU/mL 0.450 - 4.500 04/10/24 1131    HgB A1c   5.3 % 4.8 - 5.6 04/10/24 1131    Varicella IgG  3,612 index Immune >165 04/10/24 1131    Hemoglobinopathy Fractionation  Comment   04/10/24 1131    Hemoglobinopathy (genetic testing)        Cystic fibrosis                   Fetal testing        Test Value Reference Range Date Time     NIPT        MSAFP        AFP-4                  2nd and 3rd Trimester       Test Value Reference Range Date Time    Hemoglobin (repeated)        Hematocrit (repeated)        Platelets   129 10*3/mm3 140 - 450 05/29/24 1948       133 10*3/mm3 140 - 450 05/24/24 1829       153 x10E3/uL 150 - 450 04/10/24 1131       137 10*3/mm3 140 - 450 04/08/24 2305       164 10*3/mm3 140 - 450 03/26/24 1840    1 hour GTT         Antibody Screen (repeated)        3rd TM syphilis scrn (repeated)  RPR         3rd TM syphilis scrn (repeated) TP-Ab        3rd TM syphilis screen TB-Ab (FTA)        Syphilis cascade test TP-Ab (EIA)        Syphilis cascade TPPA        GTT Fasting        GTT 1 Hr        GTT 2 Hr        GTT 3 Hr        Group B Strep                  Other testing        Test Value Reference Range Date Time    Parvo IgG         CMV IgG                   Drug Screening       Test Value Reference Range Date Time    Amphetamine Screen        Barbiturate Screen        Benzodiazepine Screen        Methadone Screen        Phencyclidine Screen        Opiates Screen        THC Screen        Cocaine Screen        Propoxyphene Screen        Buprenorphine Screen        Methamphetamine Screen        Oxycodone Screen        Tricyclic Antidepressants Screen                  Legend    ^: Historical                          External Prenatal Results       Pregnancy Outside Results - Transcribed From Office Records - See Scanned Records For Details       Test Value Date Time    ABO  O  04/10/24 1131    Rh  Positive  04/10/24 1131    Antibody Screen  Negative  04/10/24 1131       Negative  04/08/24 2305    Varicella IgG  3,612 index 04/10/24 1131    Rubella  1.54 index 04/10/24 1131    Hgb  12.7 g/dL 05/29/24 1948       12.3 g/dL 05/24/24 1829       13.8 g/dL 04/10/24 1131       12.7 g/dL 04/08/24 2305       13.2 g/dL 03/26/24 1840    Hct  38.7 % 05/29/24 1948       37.5 % 05/24/24 1829       41.7 % 04/10/24 1131       38.5 % 04/08/24 2305        40.8 % 03/26/24 1840    HgB A1c   5.3 % 04/10/24 1131    1h GTT       3h GTT Fasting       3h GTT 1 hour       3h GTT 2 hour       3h GTT 3 hour        Gonorrhea (discrete)  Negative  08/07/24 0939       Negative  04/10/24 1441    Chlamydia (discrete)  Negative  08/07/24 0939       Negative  04/10/24 1441    RPR  Non Reactive  04/10/24 1131    Syphils cascade: TP-Ab (FTA)       TP-Ab       TP-Ab (EIA)       TPPA       HBsAg  Negative  04/10/24 1131    Herpes Simplex Virus PCR       Herpes Simplex VIrus Culture       HIV  Non Reactive  04/10/24 1131    Hep C RNA Quant PCR       Hep C Antibody  Non Reactive  04/10/24 1131    AFP       NIPT       Cystic Fibroisis        Group B Strep       GBS Susceptibility to Clindamycin       GBS Susceptibility to Erythromycin       Fetal Fibronectin       Genetic Testing, Maternal Blood                 Drug Screening       Test Value Date Time    Urine Drug Screen       Amphetamine Screen       Barbiturate Screen       Benzodiazepine Screen       Methadone Screen       Phencyclidine Screen       Opiates Screen       THC Screen       Cocaine Screen       Propoxyphene Screen       Buprenorphine Screen       Methamphetamine Screen       Oxycodone Screen       Tricyclic Antidepressants Screen                 Legend    ^: Historical                              Family History   Problem Relation Age of Onset    Breast cancer Maternal Aunt 43       Social History     Tobacco Use    Smoking status: Never    Smokeless tobacco: Never   Vaping Use    Vaping status: Never Used   Substance Use Topics    Alcohol use: Not Currently    Drug use: Never       Patient has no known allergies.      Current Facility-Administered Medications:     acetaminophen (TYLENOL) tablet 650 mg, 650 mg, Oral, Q4H PRN, Gela Morris MD    azithromycin (ZITHROMAX) 500 mg in sodium chloride 0.9 % 250 mL IVPB-VTB, 500 mg, Intravenous, Q24H, Sukumar Garvin MD    betamethasone acetate-betamethasone sodium  phosphate (CELESTONE SOLUSPAN) injection 12 mg, 12 mg, Intramuscular, Q24H, Gela Morris MD, 12 mg at 08/31/24 0906    ceFAZolin 2000 mg IVPB in 100 mL NS (MBP), 2,000 mg, Intravenous, Once, Sukumar Garvin MD    famotidine (PEPCID) injection 20 mg, 20 mg, Intravenous, BID PRN **OR** famotidine (PEPCID) tablet 20 mg, 20 mg, Oral, BID PRN, Gela Morris MD    lactated ringers bolus 1,000 mL, 1,000 mL, Intravenous, Once PRN, Gela Morris MD, Last Rate: 1,000 mL/hr at 08/31/24 0907, 1,000 mL at 08/31/24 0907    lactated ringers infusion, 125 mL/hr, Intravenous, Continuous, Gela Morris MD    lidocaine (XYLOCAINE) 1 % injection 0.5 mL, 0.5 mL, Intradermal, Once PRN, Gela Morris MD    magnesium sulfate 20 GM/500ML infusion, 2 g/hr, Intravenous, Continuous, Gela Morris MD    magnesium sulfate bolus from bag 0.04 g/mL solution 6 g, 6 g, Intravenous, Once, Gela Morris MD, Last Rate: 300 mL/hr at 08/31/24 0909, 6 g at 08/31/24 0909    mineral oil liquid 30 mL, 30 mL, Topical, Once PRN, Gela Morris MD    ondansetron ODT (ZOFRAN-ODT) disintegrating tablet 4 mg, 4 mg, Oral, Q6H PRN **OR** ondansetron (ZOFRAN) injection 4 mg, 4 mg, Intravenous, Q6H PRN, Gela Morris MD    sodium chloride 0.9 % flush 10 mL, 10 mL, Intravenous, Q12H, Gela Morris MD    sodium chloride 0.9 % flush 10 mL, 10 mL, Intravenous, PRN, Gela Morris MD    sodium chloride 0.9 % infusion 40 mL, 40 mL, Intravenous, PRN, Gela Morris MD    terbutaline (BRETHINE) injection 0.25 mg, 0.25 mg, Subcutaneous, PRN, Gela Morris MD    Review of Systems  Review of Systems   Gastrointestinal:  Positive for abdominal pain.   Genitourinary:  Positive for vaginal discharge. Negative for vaginal bleeding.       Objective     Vital Signs       Physical Exam:  Physical Exam  Constitutional:       General: She is not in acute distress.  Pulmonary:       Effort: Pulmonary effort is normal.   Abdominal:      Palpations: Abdomen is soft.      Tenderness: There is no abdominal tenderness.   Skin:     General: Skin is warm.   Neurological:      Mental Status: She is alert.   Psychiatric:         Mood and Affect: Mood normal.         Thought Content: Thought content normal.         Judgment: Judgment normal.       Vaginal exam completed by the hospitalist     Labs:          Assessment & Plan     1. Intrauterine pregnancy at 27w3d    Pregnancy    Previous  section    Footling breech presentation     premature rupture of membranes (PPROM) with onset of labor within 24 hours of rupture in third trimester, antepartum      Plan:  Magnesium bolus for neuroprotection and then proceed with emergent .  Fetus is stable for now       Reviewed procedure with patient.  I discussed the risks including but not limited to bleeding, infection and damage to internal organs.  Understanding of the procedure is voiced.     Sukmuar Garvin MD  24  09:16 EDT      Patient Care Team:  Provider, No Known as PCP - General

## 2024-08-31 NOTE — ANESTHESIA POSTPROCEDURE EVALUATION
"Patient: Nessa Perales    Procedure Summary       Date: 24 Room / Location:  [facility]    Anesthesia Start: 957 Anesthesia Stop:     Procedure:  SECTION REPEAT (Abdomen) Diagnosis: (shahrzad breech)    Surgeons: Sukumar Garvin MD Provider: Amber Ruelas MD    Anesthesia Type: spinal ASA Status: 2 - Emergent            Anesthesia Type: spinal    Vitals  Vitals Value Taken Time   /80 24 1200   Temp     Pulse 105 24 1200   Resp 16 24 1200   SpO2 100 % 24 1200   Vitals shown include unfiled device data.        Post Anesthesia Care and Evaluation    Patient location during evaluation: bedside  Patient participation: complete - patient participated  Level of consciousness: awake and alert  Pain management: adequate    Airway patency: patent  Anesthetic complications: No anesthetic complications  PONV Status: NA  Cardiovascular status: acceptable and hemodynamically stable  Respiratory status: acceptable  Hydration status: acceptable  Post Neuraxial Block status: No signs or symptoms of PDPH  Comments: /80 (BP Location: Left arm, Patient Position: Lying)   Pulse 111   Temp (P) 36.6 °C (97.8 °F) (Oral)   Resp 16   Ht 167.6 cm (66\")   Wt 64.9 kg (143 lb)   LMP 2024 (Exact Date)   SpO2 100%   BMI 23.08 kg/m²     "

## 2024-08-31 NOTE — LACTATION NOTE
P3. 27.3 week delivery. Baby in NICU. Pt reports she hasn't BF or pumped before. She is wanting to BF this baby. LC set up hgp with instructions on use and cleaning. Encouraged to pump every 3 hours. Educated on supply and demand, labeling colostrum with time and date and taking all colostrum to NICU within one hour of pumping. Faxed script for personal pump. Encouraged to call LC as needed. Used  [id number]    Lactation Consult Note    Evaluation Completed: 2024 16:14 EDT  Patient Name: Nessa Perales  :  1995  MRN:  [id number]     REFERRAL  INFORMATION:                                         DELIVERY HISTORY:        Skin to skin initiation date/time:      Skin to skin end date/time:           MATERNAL ASSESSMENT:                               INFANT ASSESSMENT:  Information for the patient's :  Deonte Perales [[id number]]   No past medical history on file.                                                                                                   MATERNAL INFANT FEEDING:                                                                       EQUIPMENT TYPE:                                 BREAST PUMPING:          LACTATION REFERRALS:

## 2024-08-31 NOTE — OBED NOTES
[facility] Provider Note        2024 09:18 EDT    Nessa Perales is a 29 y.o.  at 27w3d WALT  2024, Date entered prior to episode creation who presents for : Contractions (MEAGHAN-patient came in with complaints of contractions and LOF that started at 0730. Patient reports +FM and denies VB./)          HPI: 28 yo  at 27.3w presents with PPROM at 0730 and painful contractions. Was checked in ME[facility]AN and found to be 7 cm dilated.        Active fetus Yes   admits to ROM  admits tocontractions, cramping   denies bleeding    Prenatal care with Sukumar Garvin MD     Patient Active Problem List    Diagnosis     *Pregnancy [Z34.90]     Previous  section [Z98.891]     Footling breech presentation [O32.8XX0]      premature rupture of membranes (PPROM) with onset of labor within 24 hours of rupture in third trimester, antepartum [O42.013]     Prenatal care, subsequent pregnancy, second trimester [Z34.82]     Low platelet count [D69.6]     Prior pregnancy complicated by PIH, antepartum [O09.899]          POB:   OB History    Para Term  AB Living   4 2 2 0 1 2   SAB IAB Ectopic Molar Multiple Live Births   0 0 0 0 0 2      # Outcome Date GA Lbr Mynor/2nd Weight Sex Type Anes PTL Lv   4 Current            3 Term  40w0d   M CS-LTranv   JOAN   2 Term  40w0d   F Vag-Spont   JOAN   1 AB  5w0d             PMH:   Past Medical History:   Diagnosis Date    Postpartum hemorrhage     Preeclampsia      PSH:   Past Surgical History:   Procedure Laterality Date     SECTION       FH:    Family History   Problem Relation Age of Onset    Breast cancer Maternal Aunt 43     SH:   Social History     Tobacco Use    Smoking status: Never    Smokeless tobacco: Never   Vaping Use    Vaping status: Never Used   Substance Use Topics    Alcohol use: Not Currently    Drug use: Never       Allergies: Patient has no known allergies.    Medications:   Medications Prior to Admission  "  Medication Sig Dispense Refill Last Dose    aspirin 81 MG EC tablet Take 1 tablet by mouth Daily. 90 tablet 2     ondansetron (Zofran) 4 MG tablet Take 1 tablet by mouth Every 8 (Eight) Hours As Needed. 20 tablet 3     Prenatal Vit-Fe Fumarate-FA (Prenatal Vitamin) 27-0.8 MG tablet Take 1 tablet by mouth Daily. 30 tablet 12        Review of Systems  A 14 system review was completed and negative except for what is noted in the HPI or below  Pertinent items are noted in HPI      Physical exam       Estimated body mass index is 23.09 kg/m² as calculated from the following:    Height as of 5/29/24: 167.6 cm (65.98\").    Weight as of 8/26/24: 64.9 kg (143 lb).    General appearance - appears uncomfortable  Mental status - alert, oriented to person, place, and time  Heart - acyanotic  Abdomen - gravid, no fundal TTP  Pelvic-7/70/0, fetal breech presenting part  Skin - normal coloration and turgor, no rashes, no suspicious skin lesions noted           Membranes: Ruptured           Vaginal Exam  Method: sterile exam per physician (Dr. Morris)  Cervical Dilation (cm): 7          FHR: 140/moderate/ +accels  CTX: q4 min     U/S reviewed: bedside ultrasound showed fetus shahrzad breech presentation.        External Prenatal Results       Pregnancy Outside Results - Transcribed From Office Records - See Scanned Records For Details       Test Value Date Time    ABO  O  04/10/24 1131    Rh  Positive  04/10/24 1131    Antibody Screen  Negative  04/10/24 1131       Negative  04/08/24 2305    Varicella IgG  3,612 index 04/10/24 1131    Rubella  1.54 index 04/10/24 1131    Hgb  12.7 g/dL 05/29/24 1948       12.3 g/dL 05/24/24 1829       13.8 g/dL 04/10/24 1131       12.7 g/dL 04/08/24 2305       13.2 g/dL 03/26/24 1840    Hct  38.7 % 05/29/24 1948       37.5 % 05/24/24 1829       41.7 % 04/10/24 1131       38.5 % 04/08/24 2305       40.8 % 03/26/24 1840    HgB A1c   5.3 % 04/10/24 1131    1h GTT       3h GTT Fasting       3h GTT 1 " hour       3h GTT 2 hour       3h GTT 3 hour        Gonorrhea (discrete)  Negative  24 0939       Negative  04/10/24 1441    Chlamydia (discrete)  Negative  24 0939       Negative  04/10/24 1441    RPR  Non Reactive  04/10/24 1131    Syphils cascade: TP-Ab (FTA)       TP-Ab       TP-Ab (EIA)       TPPA       HBsAg  Negative  04/10/24 1131    Herpes Simplex Virus PCR       Herpes Simplex VIrus Culture       HIV  Non Reactive  04/10/24 1131    Hep C RNA Quant PCR       Hep C Antibody  Non Reactive  04/10/24 1131    AFP       NIPT       Cystic Fibroisis        Group B Strep       GBS Susceptibility to Clindamycin       GBS Susceptibility to Erythromycin       Fetal Fibronectin       Genetic Testing, Maternal Blood                 Drug Screening       Test Value Date Time    Urine Drug Screen       Amphetamine Screen       Barbiturate Screen       Benzodiazepine Screen       Methadone Screen       Phencyclidine Screen       Opiates Screen       THC Screen       Cocaine Screen       Propoxyphene Screen       Buprenorphine Screen       Methamphetamine Screen       Oxycodone Screen       Tricyclic Antidepressants Screen                 Legend    ^: Historical                              Impression:   @ 27w3d .  Final Diagnosis: PPROM, PTL advanced dilation    Plan:  1. Admit to labor and delivery for  delivery for advanced cervical dilation, breech fetal presentation, history of prior  delivery. Betamethasone IM given and Magnesium sulfate 6g bolus completed.   2. Plan of care has been reviewed with patient using   3.  Risks, benefits of treatment plan have been discussed.  4.  All questions have been answered.  5.  Proceed to RCD with Dr. Garvin       I discussed the patients findings and my recommendations with patient, nursing staff, and primary care team      Gela Morris MD  2024  09:18 EDT

## 2024-08-31 NOTE — PROGRESS NOTES
I was scrubbed and actively participating as first assistant, including providing exposure, delivery assistance, hemostatic maneuvers and closure for Dr. Garvin.

## 2024-08-31 NOTE — PLAN OF CARE
Problem: Adult Inpatient Plan of Care  Goal: Plan of Care Review  Outcome: Ongoing, Progressing  Flowsheets (Taken 8/31/2024 1252)  Outcome Evaluation: pt arrived to unit 27.3 weeks [facility] at 0730 at home, laboring, R c/s breech. Admited for r c/s  Goal: Patient-Specific Goal (Individualized)  Outcome: Ongoing, Progressing  Goal: Absence of Hospital-Acquired Illness or Injury  Outcome: Ongoing, Progressing  Intervention: Identify and Manage Fall Risk  Recent Flowsheet Documentation  Taken 8/31/2024 1115 by Juan Bauer RN  Safety Promotion/Fall Prevention: safety round/check completed  Intervention: Prevent and Manage VTE (Venous Thromboembolism) Risk  Recent Flowsheet Documentation  Taken 8/31/2024 1115 by Juan Bauer RN  Activity Management: bedrest  VTE Prevention/Management:   bilateral   sequential compression devices on  Taken 8/31/2024 1100 by Juan Bauer RN  Activity Management: bedrest  VTE Prevention/Management:   bilateral   sequential compression devices on  Goal: Optimal Comfort and Wellbeing  Outcome: Ongoing, Progressing  Intervention: Provide Person-Centered Care  Recent Flowsheet Documentation  Taken 8/31/2024 1100 by Juan Bauer RN  Trust Relationship/Rapport:   care explained   choices provided   emotional support provided   empathic listening provided   questions answered   questions encouraged   reassurance provided   thoughts/feelings acknowledged  Goal: Readiness for Transition of Care  Outcome: Ongoing, Progressing     Problem: Device-Related Complication Risk (Anesthesia/Analgesia, Neuraxial)  Goal: Safe Infusion Delivery Completion  Outcome: Ongoing, Progressing     Problem: Infection (Anesthesia/Analgesia, Neuraxial)  Goal: Absence of Infection Signs and Symptoms  Outcome: Ongoing, Progressing     Problem: Nausea and Vomiting (Anesthesia/Analgesia, Neuraxial)  Goal: Nausea and Vomiting Relief  Outcome: Ongoing, Progressing     Problem: Pain (Anesthesia/Analgesia,  Neuraxial)  Goal: Effective Pain Control  Outcome: Ongoing, Progressing  Intervention: Prevent or Manage Pain  Recent Flowsheet Documentation  Taken 2024 1100 by Juan Bauer RN  Diversional Activities: smartphone     Problem: Respiratory Compromise (Anesthesia/Analgesia, Neuraxial)  Goal: Effective Oxygenation and Ventilation  Outcome: Ongoing, Progressing     Problem: Sensorimotor Impairment (Anesthesia/Analgesia, Neuraxial)  Goal: Baseline Motor Function  Outcome: Ongoing, Progressing  Intervention: Optimize Sensorimotor Function  Recent Flowsheet Documentation  Taken 2024 1115 by Juan Bauer RN  Safety Promotion/Fall Prevention: safety round/check completed     Problem: Urinary Retention (Anesthesia/Analgesia, Neuraxial)  Goal: Effective Urinary Elimination  Outcome: Ongoing, Progressing     Problem: Pain Acute  Goal: Acceptable Pain Control and Functional Ability  Outcome: Ongoing, Progressing  Intervention: Optimize Psychosocial Wellbeing  Recent Flowsheet Documentation  Taken 2024 1100 by Juan Bauer RN  Diversional Activities: smartphone     Problem: Fall Injury Risk  Goal: Absence of Fall and Fall-Related Injury  Outcome: Ongoing, Progressing  Intervention: Promote Injury-Free Environment  Recent Flowsheet Documentation  Taken 2024 1115 by Juan Bauer RN  Safety Promotion/Fall Prevention: safety round/check completed     Problem: Adjustment to Role Transition (Postpartum  Delivery)  Goal: Successful Maternal Role Transition  Outcome: Ongoing, Progressing     Problem: Bleeding (Postpartum  Delivery)  Goal: Hemostasis  Outcome: Ongoing, Progressing     Problem: Infection (Postpartum  Delivery)  Goal: Absence of Infection Signs and Symptoms  Outcome: Ongoing, Progressing     Problem: Pain (Postpartum  Delivery)  Goal: Acceptable Pain Control  Outcome: Ongoing, Progressing     Problem: Postoperative Nausea and Vomiting (Postpartum   Delivery)  Goal: Nausea and Vomiting Relief  Outcome: Ongoing, Progressing     Problem: Postoperative Urinary Retention (Postpartum  Delivery)  Goal: Effective Urinary Elimination  Outcome: Ongoing, Progressing   Goal Outcome Evaluation:              Outcome Evaluation: pt arrived to unit 27.3 weeks [facility] at 0730 at home, laboring, R c/s breech. Admited for r c/s

## 2024-08-31 NOTE — ANESTHESIA PROCEDURE NOTES
Spinal Block      Start Time: 8/31/2024 10:03 AM  Performed By  Anesthesiologist: Amber Ruelas MD  Preanesthetic Checklist  Completed: patient identified, IV checked, site marked, risks and benefits discussed, surgical consent, monitors and equipment checked, pre-op evaluation and timeout performed  Spinal Block Prep:  Patient Position:left lateral decubitus  Sterile Tech:cap, gloves and sterile barriers  Patient Monitoring:EKG, continuous pulse oximetry and blood pressure monitoring    Spinal Block Procedure  Approach:midline  Guidance:palpation technique  Location:L3-L4  Needle Type:Sprotte  Needle Gauge:24 G  Placement of Spinal needle event:cerebrospinal fluid aspirated  Paresthesia: no  Fluid Appearance:clear  Medications: fentaNYL citrate (PF) (SUBLIMAZE) injection - Intrathecal   20 mcg - 8/31/2024 10:03:00 AM  Morphine sulfate (PF) injection - Spinal   200 mcg - 8/31/2024 10:03:00 AM  bupivacaine PF (MARCAINE) 0.75 % injection - Spinal   1.6 mL - 8/31/2024 10:03:00 AM   Post Assessment  Patient Tolerance:patient tolerated the procedure well with no apparent complications  Complications no  Additional Notes  Easily placed

## 2024-09-01 LAB
ANISOCYTOSIS BLD QL: ABNORMAL
BASOPHILS # BLD MANUAL: 0.19 10*3/MM3 (ref 0–0.2)
BASOPHILS NFR BLD MANUAL: 1 % (ref 0–1.5)
DEPRECATED RDW RBC AUTO: 41.5 FL (ref 37–54)
EOSINOPHIL # BLD MANUAL: 0 10*3/MM3 (ref 0–0.4)
EOSINOPHIL NFR BLD MANUAL: 0 % (ref 0.3–6.2)
ERYTHROCYTE [DISTWIDTH] IN BLOOD BY AUTOMATED COUNT: 12.5 % (ref 12.3–15.4)
HCT VFR BLD AUTO: 29.8 % (ref 34–46.6)
HGB BLD-MCNC: 9.9 G/DL (ref 12–15.9)
LYMPHOCYTES # BLD MANUAL: 0.57 10*3/MM3 (ref 0.7–3.1)
LYMPHOCYTES NFR BLD MANUAL: 1 % (ref 5–12)
MCH RBC QN AUTO: 30.1 PG (ref 26.6–33)
MCHC RBC AUTO-ENTMCNC: 33.2 G/DL (ref 31.5–35.7)
MCV RBC AUTO: 90.6 FL (ref 79–97)
MONOCYTES # BLD: 0.19 10*3/MM3 (ref 0.1–0.9)
NEUTROPHILS # BLD AUTO: 18.15 10*3/MM3 (ref 1.7–7)
NEUTROPHILS NFR BLD MANUAL: 95 % (ref 42.7–76)
PLAT MORPH BLD: NORMAL
PLATELET # BLD AUTO: 136 10*3/MM3 (ref 140–450)
PMV BLD AUTO: 11.6 FL (ref 6–12)
RBC # BLD AUTO: 3.29 10*6/MM3 (ref 3.77–5.28)
VARIANT LYMPHS NFR BLD MANUAL: 3 % (ref 19.6–45.3)
WBC MORPH BLD: NORMAL
WBC NRBC COR # BLD AUTO: 19.11 10*3/MM3 (ref 3.4–10.8)

## 2024-09-01 PROCEDURE — 85025 COMPLETE CBC W/AUTO DIFF WBC: CPT | Performed by: OBSTETRICS & GYNECOLOGY

## 2024-09-01 PROCEDURE — 25010000002 KETOROLAC TROMETHAMINE PER 15 MG: Performed by: OBSTETRICS & GYNECOLOGY

## 2024-09-01 PROCEDURE — 85007 BL SMEAR W/DIFF WBC COUNT: CPT | Performed by: OBSTETRICS & GYNECOLOGY

## 2024-09-01 RX ADMIN — ACETAMINOPHEN 325MG 650 MG: 325 TABLET ORAL at 16:42

## 2024-09-01 RX ADMIN — SIMETHICONE 80 MG: 80 TABLET, CHEWABLE ORAL at 22:45

## 2024-09-01 RX ADMIN — ACETAMINOPHEN 1000 MG: 500 TABLET ORAL at 10:48

## 2024-09-01 RX ADMIN — IBUPROFEN 600 MG: 600 TABLET, FILM COATED ORAL at 19:35

## 2024-09-01 RX ADMIN — SIMETHICONE 80 MG: 80 TABLET, CHEWABLE ORAL at 12:41

## 2024-09-01 RX ADMIN — ACETAMINOPHEN 1000 MG: 500 TABLET ORAL at 03:42

## 2024-09-01 RX ADMIN — KETOROLAC TROMETHAMINE 15 MG: 15 INJECTION, SOLUTION INTRAMUSCULAR; INTRAVENOUS at 06:40

## 2024-09-01 RX ADMIN — ACETAMINOPHEN 325MG 650 MG: 325 TABLET ORAL at 22:45

## 2024-09-01 RX ADMIN — KETOROLAC TROMETHAMINE 15 MG: 15 INJECTION, SOLUTION INTRAMUSCULAR; INTRAVENOUS at 00:25

## 2024-09-01 RX ADMIN — OXYCODONE HYDROCHLORIDE 5 MG: 5 TABLET ORAL at 21:09

## 2024-09-01 RX ADMIN — IBUPROFEN 600 MG: 600 TABLET, FILM COATED ORAL at 12:41

## 2024-09-01 NOTE — LACTATION NOTE
Pt is pumping now. She reports she is pumping every 3 hours and getting drops of colostrum. Pt denies questions. Encouraged to call LC as needed.  Lactation Consult Note    Evaluation Completed: 2024 10:58 EDT  Patient Name: Nessa Perales  :  1995  MRN:  [id number]     REFERRAL  INFORMATION:                                         DELIVERY HISTORY:        Skin to skin initiation date/time:      Skin to skin end date/time:           MATERNAL ASSESSMENT:                               INFANT ASSESSMENT:  Information for the patient's :  Deonte Perales [[id number]]   No past medical history on file.                                                                                                   MATERNAL INFANT FEEDING:                                                                       EQUIPMENT TYPE:                                 BREAST PUMPING:          LACTATION REFERRALS:

## 2024-09-01 NOTE — PLAN OF CARE
Goal Outcome Evaluation:  Plan of Care Reviewed With: patient        Progress: improving  Outcome Evaluation: VSS. Pt up and ambulating. Fundal assessment and lochia, wnl.  Pain controlled w/ ERAS scheduled meds.

## 2024-09-01 NOTE — PROGRESS NOTES
2024    Name:Nessa Perales    MR#:[id number]     Progress Note:  Post-Op day 1 S/P    HD:1    Subjective   29 y.o. yo Female  s/p CS at 27w3d doing well. Pain well controlled. Tolerating regular diet and having flatus. Lochia normal.     Visit conducted with  by video    Patient Active Problem List   Diagnosis    Prenatal care, subsequent pregnancy, second trimester    Low platelet count    Prior pregnancy complicated by PIH, antepartum    Pregnancy    Previous  section    Footling breech presentation     premature rupture of membranes (PPROM) with onset of labor within 24 hours of rupture in third trimester, antepartum    S/P  section        Objective    Vitals  Temp:  Temp:  [97.7 °F (36.5 °C)-98.7 °F (37.1 °C)] 97.7 °F (36.5 °C)  Temp src: Oral  BP:  BP: (106-146)/(47-91) 106/65  Pulse:  Heart Rate:  [] 87  RR:   Resp:  [16-18] 16  Weight: 64.9 kg (143 lb)  BMI:  Body mass index is 23.08 kg/m².      General Awake, alert, no distress  Abdomen Soft, non-distended, fundus firm, 3 cm below umbilicus, appropriately tender  Incision  Intact, no erythema or exudate  Extremities Calves NT bilaterally         I/O last 3 completed shifts:  In: 1284 [I.V.:1284]  Out: 4107 [Urine:3350; Blood:757]    LABS:   Lab Results   Component Value Date    WBC 19.11 (H) 2024    HGB 9.9 (L) 2024    HCT 29.8 (L) 2024    MCV 90.6 2024     (L) 2024       Infant: female       Assessment   1.  POD 1    Plan: Doing well.  Routine postoperative care  Baby in NICU due to prematurity      Pregnancy    Previous  section    Footling breech presentation     premature rupture of membranes (PPROM) with onset of labor within 24 hours of rupture in third trimester, antepartum    S/P  section      Michell Franks MD  2024 10:52 EDT

## 2024-09-01 NOTE — PLAN OF CARE
Goal Outcome Evaluation:            VSS, fundus firm, alert, lochia scant, pain controlled with medication, has done well getting up to visit infant in nicu, due to void after catheter removal.

## 2024-09-02 LAB — GLUCOSE BLDC GLUCOMTR-MCNC: 110 MG/DL (ref 70–130)

## 2024-09-02 PROCEDURE — 0503F POSTPARTUM CARE VISIT: CPT | Performed by: OBSTETRICS & GYNECOLOGY

## 2024-09-02 PROCEDURE — 82948 REAGENT STRIP/BLOOD GLUCOSE: CPT

## 2024-09-02 RX ORDER — DOCUSATE SODIUM 100 MG/1
100 CAPSULE, LIQUID FILLED ORAL 2 TIMES DAILY PRN
Status: DISCONTINUED | OUTPATIENT
Start: 2024-09-02 | End: 2024-09-05 | Stop reason: HOSPADM

## 2024-09-02 RX ADMIN — IBUPROFEN 600 MG: 600 TABLET, FILM COATED ORAL at 02:27

## 2024-09-02 RX ADMIN — OXYCODONE HYDROCHLORIDE 5 MG: 5 TABLET ORAL at 11:38

## 2024-09-02 RX ADMIN — DOCUSATE SODIUM 100 MG: 100 CAPSULE, LIQUID FILLED ORAL at 19:35

## 2024-09-02 RX ADMIN — OXYCODONE HYDROCHLORIDE 10 MG: 10 TABLET ORAL at 19:35

## 2024-09-02 RX ADMIN — DOCUSATE SODIUM 100 MG: 100 CAPSULE, LIQUID FILLED ORAL at 09:29

## 2024-09-02 RX ADMIN — ACETAMINOPHEN 325MG 650 MG: 325 TABLET ORAL at 04:39

## 2024-09-02 RX ADMIN — IBUPROFEN 600 MG: 600 TABLET, FILM COATED ORAL at 23:08

## 2024-09-02 RX ADMIN — OXYCODONE HYDROCHLORIDE 5 MG: 5 TABLET ORAL at 16:01

## 2024-09-02 RX ADMIN — IBUPROFEN 600 MG: 600 TABLET, FILM COATED ORAL at 16:01

## 2024-09-02 RX ADMIN — IBUPROFEN 600 MG: 600 TABLET, FILM COATED ORAL at 09:29

## 2024-09-02 RX ADMIN — ACETAMINOPHEN 325MG 650 MG: 325 TABLET ORAL at 11:38

## 2024-09-02 NOTE — LACTATION NOTE
This note was copied from a baby's chart.  Rounded on PT at this time. Baby is in NICU and mom continues to pump with the HGP. Reports she got 30ml. last time. Encouraged her to continue pumping and educated on the importance of stimulation for adequate milk supply. She denies any questions.

## 2024-09-02 NOTE — PROGRESS NOTES
" POSTPARTUM ROUNDS    Chief complaint: post C/S concerns  SUBJECTIVE:  Patient appears comfortable, and pain seems to be controlled with by mouth medicines.  She is ambulating. .  Discussed advancing activity and diet.       ROS:  Pulm: neg for soa  GI: neg for heavy bleeding  Musculoskel: neg for leg pain      OBJECTIVE:  Vital Signs: /62 (BP Location: Right arm)   Pulse 98   Temp 97.6 °F (36.4 °C) (Oral)   Resp 16   Ht 167.6 cm (66\")   Wt 64.9 kg (143 lb)   LMP 2024 (Exact Date)   SpO2 100%   Breastfeeding Yes   BMI 23.08 kg/m²     Intake/Output Summary (Last 24 hours) at 2024 1047  Last data filed at 2024 0724  Gross per 24 hour   Intake 1620 ml   Output 500 ml   Net 1120 ml       Constitutional:  The patient is well nourished.  Cardiovascular:  RRR  Resp:  nonlabored breathing  The incision is normal  Gastrointestinal:  fundus firm below umbilicus  Extremities:  no edema,no evidence dvt    LABS / IMAGING:  Lab Results (last 24 hours)       ** No results found for the last 24 hours. **              OB History    Para Term  AB Living   4 3 2 1 1 3   SAB IAB Ectopic Molar Multiple Live Births           0 3      # Outcome Date GA Lbr Mynor/2nd Weight Sex Type Anes PTL Lv   4  24 27w3d  1170 g (2 lb 9.3 oz) F CS-LTranv Spinal Y JOAN      Birth Comments: panda OR 1      Complications:  premature rupture of membranes (PPROM) with unknown onset of labor   3 Term  40w0d   M CS-LTranv   JOAN   2 Term  40w0d   F Vag-Spont   JAON   1 AB  5w0d                 ASSESSMENT AND PLAN:    Principal Problem:    Pregnancy  Active Problems:    Previous  section    Footling breech presentation     premature rupture of membranes (PPROM) with onset of labor within 24 hours of rupture in third trimester, antepartum    S/P  section         Patient is postop day 2 from LTCS  Patient is doing well  Advance diet as tolerated  Incision is " healing well  Patient is ambulating  Infant still in NICU stable at present      Regular diet   Encourage ambulation     Eric Bautista MD    9/2/2024  10:47 EDT

## 2024-09-02 NOTE — PLAN OF CARE
Goal Outcome Evaluation:  Plan of Care Reviewed With: patient           Outcome Evaluation: VSS. Pt up ad fabiano. Pain well controlled with ERAS meds and Vicki 5 mg. fundus and lochia WNL. Pt appears hopeful and happy, visiting baby a lot in the NICU. no new concerns at this time.

## 2024-09-03 LAB
BASOPHILS # BLD AUTO: 0.03 10*3/MM3 (ref 0–0.2)
BASOPHILS NFR BLD AUTO: 0.3 % (ref 0–1.5)
DEPRECATED RDW RBC AUTO: 40.5 FL (ref 37–54)
EOSINOPHIL # BLD AUTO: 0.12 10*3/MM3 (ref 0–0.4)
EOSINOPHIL NFR BLD AUTO: 1.1 % (ref 0.3–6.2)
ERYTHROCYTE [DISTWIDTH] IN BLOOD BY AUTOMATED COUNT: 12.4 % (ref 12.3–15.4)
GENTAMICIN SERPL-MCNC: 0.5 MCG/ML (ref 0.5–10)
GENTAMICIN SERPL-MCNC: 4.4 MCG/ML (ref 0.5–10)
HCT VFR BLD AUTO: 27.8 % (ref 34–46.6)
HGB BLD-MCNC: 9.2 G/DL (ref 12–15.9)
IMM GRANULOCYTES # BLD AUTO: 0.08 10*3/MM3 (ref 0–0.05)
IMM GRANULOCYTES NFR BLD AUTO: 0.7 % (ref 0–0.5)
LYMPHOCYTES # BLD AUTO: 1.43 10*3/MM3 (ref 0.7–3.1)
LYMPHOCYTES NFR BLD AUTO: 12.9 % (ref 19.6–45.3)
MCH RBC QN AUTO: 29.9 PG (ref 26.6–33)
MCHC RBC AUTO-ENTMCNC: 33.1 G/DL (ref 31.5–35.7)
MCV RBC AUTO: 90.3 FL (ref 79–97)
MONOCYTES # BLD AUTO: 1.19 10*3/MM3 (ref 0.1–0.9)
MONOCYTES NFR BLD AUTO: 10.8 % (ref 5–12)
NEUTROPHILS NFR BLD AUTO: 74.2 % (ref 42.7–76)
NEUTROPHILS NFR BLD AUTO: 8.2 10*3/MM3 (ref 1.7–7)
NRBC BLD AUTO-RTO: 0 /100 WBC (ref 0–0.2)
PLATELET # BLD AUTO: 147 10*3/MM3 (ref 140–450)
PMV BLD AUTO: 11.1 FL (ref 6–12)
RBC # BLD AUTO: 3.08 10*6/MM3 (ref 3.77–5.28)
WBC NRBC COR # BLD AUTO: 11.05 10*3/MM3 (ref 3.4–10.8)

## 2024-09-03 PROCEDURE — 80170 ASSAY OF GENTAMICIN: CPT | Performed by: OBSTETRICS & GYNECOLOGY

## 2024-09-03 PROCEDURE — 25010000002 AMPICILLIN PER 500 MG: Performed by: OBSTETRICS & GYNECOLOGY

## 2024-09-03 PROCEDURE — 25010000002 CLINDAMYCIN 900 MG/50ML SOLUTION: Performed by: OBSTETRICS & GYNECOLOGY

## 2024-09-03 PROCEDURE — 85025 COMPLETE CBC W/AUTO DIFF WBC: CPT | Performed by: OBSTETRICS & GYNECOLOGY

## 2024-09-03 PROCEDURE — 25010000002 GENTAMICIN PER 80 MG: Performed by: OBSTETRICS & GYNECOLOGY

## 2024-09-03 RX ORDER — CLINDAMYCIN PHOSPHATE 900 MG/50ML
900 INJECTION, SOLUTION INTRAVENOUS EVERY 8 HOURS
Status: COMPLETED | OUTPATIENT
Start: 2024-09-03 | End: 2024-09-05

## 2024-09-03 RX ADMIN — CLINDAMYCIN PHOSPHATE 900 MG: 900 INJECTION, SOLUTION INTRAVENOUS at 22:18

## 2024-09-03 RX ADMIN — GENTAMICIN SULFATE 320 MG: 40 INJECTION, SOLUTION INTRAMUSCULAR; INTRAVENOUS at 04:59

## 2024-09-03 RX ADMIN — CLINDAMYCIN PHOSPHATE 900 MG: 900 INJECTION, SOLUTION INTRAVENOUS at 13:25

## 2024-09-03 RX ADMIN — OXYCODONE HYDROCHLORIDE 5 MG: 5 TABLET ORAL at 22:47

## 2024-09-03 RX ADMIN — ACETAMINOPHEN 325MG 650 MG: 325 TABLET ORAL at 18:06

## 2024-09-03 RX ADMIN — IBUPROFEN 600 MG: 600 TABLET, FILM COATED ORAL at 10:00

## 2024-09-03 RX ADMIN — IBUPROFEN 600 MG: 600 TABLET, FILM COATED ORAL at 17:25

## 2024-09-03 RX ADMIN — ACETAMINOPHEN 325MG 650 MG: 325 TABLET ORAL at 06:35

## 2024-09-03 RX ADMIN — OXYCODONE HYDROCHLORIDE 5 MG: 5 TABLET ORAL at 17:30

## 2024-09-03 RX ADMIN — ACETAMINOPHEN 325MG 650 MG: 325 TABLET ORAL at 00:24

## 2024-09-03 RX ADMIN — AMPICILLIN SODIUM 2 G: 2 INJECTION, POWDER, FOR SOLUTION INTRAMUSCULAR; INTRAVENOUS at 06:36

## 2024-09-03 RX ADMIN — IBUPROFEN 600 MG: 600 TABLET, FILM COATED ORAL at 04:59

## 2024-09-03 RX ADMIN — AMPICILLIN SODIUM 2 G: 2 INJECTION, POWDER, FOR SOLUTION INTRAMUSCULAR; INTRAVENOUS at 18:13

## 2024-09-03 RX ADMIN — AMPICILLIN SODIUM 2 G: 2 INJECTION, POWDER, FOR SOLUTION INTRAMUSCULAR; INTRAVENOUS at 12:34

## 2024-09-03 RX ADMIN — ACETAMINOPHEN 325MG 650 MG: 325 TABLET ORAL at 12:33

## 2024-09-03 RX ADMIN — AMPICILLIN SODIUM 2 G: 2 INJECTION, POWDER, FOR SOLUTION INTRAMUSCULAR; INTRAVENOUS at 00:15

## 2024-09-03 NOTE — PROGRESS NOTES
"[facility] Clinical [facility]acy Services: Aminoglycoside Dosing Consult  Nessa Perales is on day 1 pharmacy to dose gentamicin for  Intraabdominal infection post  .  Dosing method: extended-interval, using [employer] Nomogram      Relevant clinical data and objective history reviewed:  29 y.o. female 167.6 cm (66\")   64.9 kg (143 lb)   Ideal body weight: 59.3 kg (130 lb 11.7 oz)  Adjusted ideal body weight: 61.5 kg (135 lb 10.2 oz)    Estimated Creatinine Clearance: 179.3 mL/min (A) (by C-G formula based on SCr of 0.47 mg/dL (L)).  Lab Results   Component Value Date    WBC 11.05 (H) 2024     Temp Readings from Last 3 Encounters:   24 98 °F (36.7 °C) (Oral)   24 98.3 °F (36.8 °C) (Oral)   24 97.6 °F (36.4 °C)         Lab Results   Component Value Date    GENTRANDOM 0.50 2024     Date Dose Level Hrs post Infusion   9/3/24 320 mg @ 0459  0.5 @ 1551 ~11               Assessment/Plan    Will continue gentamicin 320 mg IV every 24 hours (5 mg/kg based on dosing weight 64.9 kg).     Will monitor serum creatinine at least every 48 hours per [facility]'s dosing recommendations.     Next level: not indicated unless DOT is extended.     Pharmacy will continue to follow daily while on an aminoglycoside and adjust as needed.    Diana Lord, [facility]  Clinical Pharmacist    "

## 2024-09-03 NOTE — PLAN OF CARE
Problem: Adult Inpatient Plan of Care  Goal: Plan of Care Review  Outcome: Ongoing, Progressing  Flowsheets (Taken 9/3/2024 1409)  Progress: improving  Plan of Care Reviewed With: patient  Outcome Evaluation: pt vss, pain well controlled with motrin, tylenol and ice packs. up ad fabiano. pumping tons of milk. incision open to air, elena wound bruising. bleeding scant to light. clindamycin 900mg Q8 hours added onto IV antibiotic regimen. resting well.  Goal: Patient-Specific Goal (Individualized)  Outcome: Ongoing, Progressing  Goal: Absence of Hospital-Acquired Illness or Injury  Outcome: Ongoing, Progressing  Intervention: Identify and Manage Fall Risk  Recent Flowsheet Documentation  Taken 9/3/2024 1233 by Luiza Santana RN  Safety Promotion/Fall Prevention: safety round/check completed  Taken 9/3/2024 1035 by Luiza Santana RN  Safety Promotion/Fall Prevention: safety round/check completed  Taken 9/3/2024 0915 by Luiza Santana RN  Safety Promotion/Fall Prevention: safety round/check completed  Taken 9/3/2024 0825 by Luiza Santana RN  Safety Promotion/Fall Prevention: safety round/check completed  Intervention: Prevent and Manage VTE (Venous Thromboembolism) Risk  Recent Flowsheet Documentation  Taken 9/3/2024 0915 by Luiza Santana RN  Activity Management: up ad fabiano  Goal: Optimal Comfort and Wellbeing  Outcome: Ongoing, Progressing  Intervention: Monitor Pain and Promote Comfort  Recent Flowsheet Documentation  Taken 9/3/2024 1233 by Luiza Santana RN  Pain Management Interventions:   see MAR   quiet environment facilitated   pain management plan reviewed with patient/caregiver   cold applied  Taken 9/3/2024 0825 by Luiza Santana RN  Pain Management Interventions:   quiet environment facilitated   pain management plan reviewed with patient/caregiver   medication offered but refused  Intervention: Provide Person-Centered Care  Recent Flowsheet Documentation  Taken 9/3/2024 0915 by  Francke, Luiza, RN  Trust Relationship/Rapport:   care explained   choices provided   emotional support provided   empathic listening provided   questions answered   questions encouraged   reassurance provided   thoughts/feelings acknowledged  Goal: Readiness for Transition of Care  Outcome: Ongoing, Progressing     Problem: Device-Related Complication Risk (Anesthesia/Analgesia, Neuraxial)  Goal: Safe Infusion Delivery Completion  Outcome: Ongoing, Progressing     Problem: Infection (Anesthesia/Analgesia, Neuraxial)  Goal: Absence of Infection Signs and Symptoms  Outcome: Ongoing, Progressing     Problem: Nausea and Vomiting (Anesthesia/Analgesia, Neuraxial)  Goal: Nausea and Vomiting Relief  Outcome: Ongoing, Progressing     Problem: Pain (Anesthesia/Analgesia, Neuraxial)  Goal: Effective Pain Control  Outcome: Ongoing, Progressing  Intervention: Prevent or Manage Pain  Recent Flowsheet Documentation  Taken 9/3/2024 1233 by Luiza Santana RN  Pain Management Interventions:   see MAR   quiet environment facilitated   pain management plan reviewed with patient/caregiver   cold applied  Taken 9/3/2024 0825 by Luiza Santana RN  Pain Management Interventions:   quiet environment facilitated   pain management plan reviewed with patient/caregiver   medication offered but refused     Problem: Respiratory Compromise (Anesthesia/Analgesia, Neuraxial)  Goal: Effective Oxygenation and Ventilation  Outcome: Ongoing, Progressing     Problem: Sensorimotor Impairment (Anesthesia/Analgesia, Neuraxial)  Goal: Baseline Motor Function  Outcome: Ongoing, Progressing  Intervention: Optimize Sensorimotor Function  Recent Flowsheet Documentation  Taken 9/3/2024 1233 by Luiza Santana RN  Safety Promotion/Fall Prevention: safety round/check completed  Taken 9/3/2024 1035 by Luiza Santana RN  Safety Promotion/Fall Prevention: safety round/check completed  Taken 9/3/2024 0915 by Luiza Santana, RN  Safety  Promotion/Fall Prevention: safety round/check completed  Taken 9/3/2024 0825 by Luiza Santana RN  Safety Promotion/Fall Prevention: safety round/check completed     Problem: Urinary Retention (Anesthesia/Analgesia, Neuraxial)  Goal: Effective Urinary Elimination  Outcome: Ongoing, Progressing     Problem: Pain Acute  Goal: Acceptable Pain Control and Functional Ability  Outcome: Ongoing, Progressing  Intervention: Develop Pain Management Plan  Recent Flowsheet Documentation  Taken 9/3/2024 1233 by Luiza Santana RN  Pain Management Interventions:   see MAR   quiet environment facilitated   pain management plan reviewed with patient/caregiver   cold applied  Taken 9/3/2024 0825 by Luiza Santana RN  Pain Management Interventions:   quiet environment facilitated   pain management plan reviewed with patient/caregiver   medication offered but refused  Intervention: Optimize Psychosocial Wellbeing  Recent Flowsheet Documentation  Taken 9/3/2024 0915 by Luiza Santana RN  Supportive Measures: active listening utilized     Problem: Fall Injury Risk  Goal: Absence of Fall and Fall-Related Injury  Outcome: Ongoing, Progressing  Intervention: Promote Injury-Free Environment  Recent Flowsheet Documentation  Taken 9/3/2024 1233 by Luiza Santana RN  Safety Promotion/Fall Prevention: safety round/check completed  Taken 9/3/2024 1035 by Luiza Santana RN  Safety Promotion/Fall Prevention: safety round/check completed  Taken 9/3/2024 0915 by Luiza Santana RN  Safety Promotion/Fall Prevention: safety round/check completed  Taken 9/3/2024 0825 by Luiza Santana RN  Safety Promotion/Fall Prevention: safety round/check completed     Problem: Adjustment to Role Transition (Postpartum  Delivery)  Goal: Successful Maternal Role Transition  Outcome: Ongoing, Progressing  Intervention: Support Maternal Role Transition  Recent Flowsheet Documentation  Taken 9/3/2024 0915 by Luiza Santana  RN  Supportive Measures: active listening utilized     Problem: Bleeding (Postpartum  Delivery)  Goal: Hemostasis  Outcome: Ongoing, Progressing     Problem: Infection (Postpartum  Delivery)  Goal: Absence of Infection Signs and Symptoms  Outcome: Ongoing, Progressing     Problem: Pain (Postpartum  Delivery)  Goal: Acceptable Pain Control  Outcome: Ongoing, Progressing  Intervention: Prevent or Manage Pain  Recent Flowsheet Documentation  Taken 9/3/2024 1233 by Luiza Santana RN  Pain Management Interventions:   see MAR   quiet environment facilitated   pain management plan reviewed with patient/caregiver   cold applied  Taken 9/3/2024 0825 by Luiza Santana RN  Pain Management Interventions:   quiet environment facilitated   pain management plan reviewed with patient/caregiver   medication offered but refused     Problem: Postoperative Nausea and Vomiting (Postpartum  Delivery)  Goal: Nausea and Vomiting Relief  Outcome: Ongoing, Progressing     Problem: Postoperative Urinary Retention (Postpartum  Delivery)  Goal: Effective Urinary Elimination  Outcome: Ongoing, Progressing     Problem: Skin Injury Risk Increased  Goal: Skin Health and Integrity  Outcome: Ongoing, Progressing   Goal Outcome Evaluation:  Plan of Care Reviewed With: patient        Progress: improving  Outcome Evaluation: pt vss, pain well controlled with motrin, tylenol and ice packs. up ad fabiano. pumping tons of milk. incision open to air, elena wound bruising. bleeding scant to light. clindamycin 900mg Q8 hours added onto IV antibiotic regimen. resting well.

## 2024-09-03 NOTE — LACTATION NOTE
This note was copied from a baby's chart.  Mom standing in her doorway of room 365 with 3 bottles of EBM.  She would like her milk to be taken to NICU.  She expressed approximately 200 ml in total.  EBM is labeled with milk labels & expression times but dates are missing although milk is warm & pumped between 10am-11am.  Milk delivered to bedside RN in NICU.

## 2024-09-03 NOTE — PROGRESS NOTES
"[facility] Clinical Pharmacy Services: Aminoglycoside Dosing Consult  Pharmacy has been consulted for gentamicin dosing for  Intraabdominal infection post  .   Pharmacy dosing per Dr. Bautista's request.   Dosing method: extended-interval  [employer] using [employer] Nomogram        Relevant clinical data and objective history reviewed:  Nessa Perales 29 y.o. female 167.6 cm (66\")   64.9 kg (143 lb)   Ideal body weight: 59.3 kg (130 lb 11.7 oz)  Adjusted ideal body weight: 61.5 kg (135 lb 10.2 oz)     Estimated Creatinine Clearance: 179.3 mL/min (A) (by C-G formula based on SCr of 0.47 mg/dL (L)).  Lab Results   Component Value Date    WBC 19.11 (H) 2024     Temp Readings from Last 3 Encounters:   24 (!) 102.7 °F (39.3 °C) (Oral)   24 98.3 °F (36.8 °C) (Oral)   24 97.6 °F (36.4 °C)     Baseline culture/source/susceptibility: Ucx ordered.    No results found for: \"GENTPEAK\", \"GENTTROUGH\", \"GENTRANDOM\"    Assessment/Plan    Will start gentamicin 320 mg IV every 24 hours (5 mg/kg based on dosing weight 64.9 kg). Will monitor serum creatinine every 24 hours for the first 3 days then at least every 48 hours per dosing recommendations. Will obtain serum concentration 10 hours from first dose~ 1300 today. Pharmacy will continue to follow daily while on an aminoglycoside and adjust as needed.     Donovan Negron, [facility]  Clinical Pharmacist      "

## 2024-09-03 NOTE — PROGRESS NOTES
[facility]   PROGRESS NOTE    Post-Op Day 3 S/P      phone is used    Subjective   CC: post  section-patient had fever to 102 last p.m. and was started on amp and gent.    Patient reports:  She is having lower abdominal pain but does not want to take any narcotics.  She does find that the Motrin Tylenol and ice does help somewhat.  She is passing gas and tolerating regular diet.  She is ambulating.  She is pumping baby is in NICU.  Patient had PPROM at 27 weeks.    Review of systems- no shortness of breath, nausea or vomiting or leg pain.    Objective      Vitals: Vital Signs Range for the last 24 hours  Temperature: Temp:  [98 °F (36.7 °C)-102.7 °F (39.3 °C)] 98 °F (36.7 °C)       BP: BP: (106-140)/(69-81) 113/72   Pulse: Heart Rate:  [] 77   Respirations: Resp:  [16-18] 18                            Physical exam   General-  no acute distress, conversant    Lungs- respirations unlabored   CV- trace LE edema   Abdomen-mild distention and mild fundal tenderness   Incision -incision is clean dry and intact.  There is bruising above and below the incision.   Lower extremities- nontender bilaterally    Results from last 7 days   Lab Units 24  0741   WBC 10*3/mm3 11.05*   HEMOGLOBIN g/dL 9.2*   HEMATOCRIT % 27.8*   PLATELETS 10*3/mm3 147         Assessment & Plan        Pregnancy    Previous  section    Footling breech presentation     premature rupture of membranes (PPROM) with onset of labor within 24 hours of rupture in third trimester, antepartum    S/P  section      Assessment:    Nessa Perales is Day 3  post-op from        Fever to 102 last p.m. with fundal tenderness consistent with endometritis.  Patient was started on ampicillin and gentamicin.  I will add clindamycin.  Her white count is actually improved from 19 to 11.   Discussed pain control.  Patient does not want to try narcotics.  She will continue to ice the  incision.  Ambulation encouraged.  Recheck CBC in AM.             Angelo Light MD  9/3/2024  11:45 EDT

## 2024-09-04 PROBLEM — N71.9 ENDOMETRITIS: Status: ACTIVE | Noted: 2024-09-04

## 2024-09-04 LAB
BASOPHILS # BLD AUTO: 0.02 10*3/MM3 (ref 0–0.2)
BASOPHILS NFR BLD AUTO: 0.2 % (ref 0–1.5)
DEPRECATED RDW RBC AUTO: 40.3 FL (ref 37–54)
EOSINOPHIL # BLD AUTO: 0.18 10*3/MM3 (ref 0–0.4)
EOSINOPHIL NFR BLD AUTO: 1.6 % (ref 0.3–6.2)
ERYTHROCYTE [DISTWIDTH] IN BLOOD BY AUTOMATED COUNT: 12.5 % (ref 12.3–15.4)
HCT VFR BLD AUTO: 28.3 % (ref 34–46.6)
HGB BLD-MCNC: 9.6 G/DL (ref 12–15.9)
IMM GRANULOCYTES # BLD AUTO: 0.1 10*3/MM3 (ref 0–0.05)
IMM GRANULOCYTES NFR BLD AUTO: 0.9 % (ref 0–0.5)
LYMPHOCYTES # BLD AUTO: 1.26 10*3/MM3 (ref 0.7–3.1)
LYMPHOCYTES NFR BLD AUTO: 11.4 % (ref 19.6–45.3)
MCH RBC QN AUTO: 30.5 PG (ref 26.6–33)
MCHC RBC AUTO-ENTMCNC: 33.9 G/DL (ref 31.5–35.7)
MCV RBC AUTO: 89.8 FL (ref 79–97)
MONOCYTES # BLD AUTO: 1.3 10*3/MM3 (ref 0.1–0.9)
MONOCYTES NFR BLD AUTO: 11.8 % (ref 5–12)
NEUTROPHILS NFR BLD AUTO: 74.1 % (ref 42.7–76)
NEUTROPHILS NFR BLD AUTO: 8.19 10*3/MM3 (ref 1.7–7)
NRBC BLD AUTO-RTO: 0 /100 WBC (ref 0–0.2)
PLATELET # BLD AUTO: 157 10*3/MM3 (ref 140–450)
PMV BLD AUTO: 10.2 FL (ref 6–12)
RBC # BLD AUTO: 3.15 10*6/MM3 (ref 3.77–5.28)
WBC NRBC COR # BLD AUTO: 11.05 10*3/MM3 (ref 3.4–10.8)

## 2024-09-04 PROCEDURE — 25010000002 CLINDAMYCIN 900 MG/50ML SOLUTION: Performed by: OBSTETRICS & GYNECOLOGY

## 2024-09-04 PROCEDURE — 25010000002 AMPICILLIN PER 500 MG: Performed by: OBSTETRICS & GYNECOLOGY

## 2024-09-04 PROCEDURE — 25010000002 GENTAMICIN PER 80 MG: Performed by: OBSTETRICS & GYNECOLOGY

## 2024-09-04 PROCEDURE — 85025 COMPLETE CBC W/AUTO DIFF WBC: CPT | Performed by: OBSTETRICS & GYNECOLOGY

## 2024-09-04 RX ADMIN — ACETAMINOPHEN 325MG 650 MG: 325 TABLET ORAL at 22:42

## 2024-09-04 RX ADMIN — OXYCODONE HYDROCHLORIDE 5 MG: 5 TABLET ORAL at 09:36

## 2024-09-04 RX ADMIN — IBUPROFEN 600 MG: 600 TABLET, FILM COATED ORAL at 00:06

## 2024-09-04 RX ADMIN — CLINDAMYCIN PHOSPHATE 900 MG: 900 INJECTION, SOLUTION INTRAVENOUS at 07:21

## 2024-09-04 RX ADMIN — IBUPROFEN 600 MG: 600 TABLET, FILM COATED ORAL at 12:38

## 2024-09-04 RX ADMIN — AMPICILLIN SODIUM 2 G: 2 INJECTION, POWDER, FOR SOLUTION INTRAMUSCULAR; INTRAVENOUS at 23:52

## 2024-09-04 RX ADMIN — OXYCODONE HYDROCHLORIDE 5 MG: 5 TABLET ORAL at 18:36

## 2024-09-04 RX ADMIN — IBUPROFEN 600 MG: 600 TABLET, FILM COATED ORAL at 06:22

## 2024-09-04 RX ADMIN — ACETAMINOPHEN 325MG 650 MG: 325 TABLET ORAL at 01:12

## 2024-09-04 RX ADMIN — OXYCODONE HYDROCHLORIDE 5 MG: 5 TABLET ORAL at 14:20

## 2024-09-04 RX ADMIN — OXYCODONE HYDROCHLORIDE 10 MG: 10 TABLET ORAL at 05:22

## 2024-09-04 RX ADMIN — SIMETHICONE 80 MG: 80 TABLET, CHEWABLE ORAL at 06:38

## 2024-09-04 RX ADMIN — AMPICILLIN SODIUM 2 G: 2 INJECTION, POWDER, FOR SOLUTION INTRAMUSCULAR; INTRAVENOUS at 06:22

## 2024-09-04 RX ADMIN — IBUPROFEN 600 MG: 600 TABLET, FILM COATED ORAL at 18:36

## 2024-09-04 RX ADMIN — OXYCODONE HYDROCHLORIDE 5 MG: 5 TABLET ORAL at 22:42

## 2024-09-04 RX ADMIN — AMPICILLIN SODIUM 2 G: 2 INJECTION, POWDER, FOR SOLUTION INTRAMUSCULAR; INTRAVENOUS at 00:09

## 2024-09-04 RX ADMIN — ACETAMINOPHEN 325MG 650 MG: 325 TABLET ORAL at 08:03

## 2024-09-04 RX ADMIN — AMPICILLIN SODIUM 2 G: 2 INJECTION, POWDER, FOR SOLUTION INTRAMUSCULAR; INTRAVENOUS at 12:38

## 2024-09-04 RX ADMIN — CLINDAMYCIN PHOSPHATE 900 MG: 900 INJECTION, SOLUTION INTRAVENOUS at 22:41

## 2024-09-04 RX ADMIN — AMPICILLIN SODIUM 2 G: 2 INJECTION, POWDER, FOR SOLUTION INTRAMUSCULAR; INTRAVENOUS at 18:35

## 2024-09-04 RX ADMIN — GENTAMICIN SULFATE 320 MG: 40 INJECTION, SOLUTION INTRAMUSCULAR; INTRAVENOUS at 05:23

## 2024-09-04 RX ADMIN — CLINDAMYCIN PHOSPHATE 900 MG: 900 INJECTION, SOLUTION INTRAVENOUS at 16:05

## 2024-09-04 RX ADMIN — ACETAMINOPHEN 325MG 650 MG: 325 TABLET ORAL at 14:18

## 2024-09-04 NOTE — PLAN OF CARE
Problem: Adult Inpatient Plan of Care  Goal: Plan of Care Review  Outcome: Ongoing, Progressing  Flowsheets  Taken 9/4/2024 1536 by Magdalena Terry RN  Progress: improving  Outcome Evaluation: VSS. ABX administered throughout the day. Pain controlled with ERAS medications and prn merlene. Incision TIFFANIE, bleeding WNL, fundus firm and midline. Pumping ocassionally throughout the day. No new concerns at this time.  Taken 9/3/2024 1409 by Luiza Santana RN  Plan of Care Reviewed With: patient  Goal: Patient-Specific Goal (Individualized)  Outcome: Ongoing, Progressing  Goal: Absence of Hospital-Acquired Illness or Injury  Outcome: Ongoing, Progressing  Intervention: Identify and Manage Fall Risk  Recent Flowsheet Documentation  Taken 9/4/2024 1420 by Magdalena Terry RN  Safety Promotion/Fall Prevention: safety round/check completed  Taken 9/4/2024 1238 by Magdalena Terry RN  Safety Promotion/Fall Prevention: safety round/check completed  Taken 9/4/2024 1030 by Magdalena Terry RN  Safety Promotion/Fall Prevention: safety round/check completed  Taken 9/4/2024 0803 by Magdalena Terry RN  Safety Promotion/Fall Prevention: safety round/check completed  Intervention: Prevent Skin Injury  Recent Flowsheet Documentation  Taken 9/4/2024 1420 by Magdalena Terry RN  Body Position:   sitting up in bed   position changed independently  Taken 9/4/2024 1238 by Magdalena Trery RN  Body Position:   sitting up in bed   position changed independently  Taken 9/4/2024 0803 by Magdalena Terry RN  Body Position:   sitting up in bed   position changed independently  Intervention: Prevent and Manage VTE (Venous Thromboembolism) Risk  Recent Flowsheet Documentation  Taken 9/4/2024 1420 by Magdalena Terry RN  Activity Management:   up ad fabiano   activity encouraged  Taken 9/4/2024 1238 by Magdalena Terry RN  Activity Management:   up ad fabiano   activity encouraged  Taken 9/4/2024 1030 by Mara  KARLA Nichols  Activity Management:   up ad fabiano   activity encouraged   ambulated in room  Taken 9/4/2024 0803 by Magdalena Terry RN  Activity Management:   up ad fabiano   activity encouraged  Goal: Optimal Comfort and Wellbeing  Outcome: Ongoing, Progressing  Intervention: Monitor Pain and Promote Comfort  Recent Flowsheet Documentation  Taken 9/4/2024 1420 by Magdalena Terry RN  Pain Management Interventions: see MAR  Taken 9/4/2024 1238 by Magdalena Terry RN  Pain Management Interventions: see MAR  Taken 9/4/2024 0936 by Magdalena Terry RN  Pain Management Interventions: see MAR  Taken 9/4/2024 0803 by Magdalena Terry RN  Pain Management Interventions: see MAR  Intervention: Provide Person-Centered Care  Recent Flowsheet Documentation  Taken 9/4/2024 0803 by Magdalena Terry RN  Trust Relationship/Rapport:   care explained   choices provided  Goal: Readiness for Transition of Care  Outcome: Ongoing, Progressing     Problem: Device-Related Complication Risk (Anesthesia/Analgesia, Neuraxial)  Goal: Safe Infusion Delivery Completion  Outcome: Ongoing, Progressing     Problem: Infection (Anesthesia/Analgesia, Neuraxial)  Goal: Absence of Infection Signs and Symptoms  Outcome: Ongoing, Progressing     Problem: Nausea and Vomiting (Anesthesia/Analgesia, Neuraxial)  Goal: Nausea and Vomiting Relief  Outcome: Ongoing, Progressing     Problem: Pain (Anesthesia/Analgesia, Neuraxial)  Goal: Effective Pain Control  Outcome: Ongoing, Progressing  Intervention: Prevent or Manage Pain  Recent Flowsheet Documentation  Taken 9/4/2024 1420 by Magdalena Terry RN  Pain Management Interventions: see MAR  Taken 9/4/2024 1238 by Magdalena Terry RN  Pain Management Interventions: see MAR  Taken 9/4/2024 0936 by Magdalena Terry RN  Pain Management Interventions: see MAR  Taken 9/4/2024 0803 by Magdalena Terry RN  Pain Management Interventions: see MAR     Problem: Respiratory Compromise  (Anesthesia/Analgesia, Neuraxial)  Goal: Effective Oxygenation and Ventilation  Outcome: Ongoing, Progressing     Problem: Sensorimotor Impairment (Anesthesia/Analgesia, Neuraxial)  Goal: Baseline Motor Function  Outcome: Ongoing, Progressing  Intervention: Optimize Sensorimotor Function  Recent Flowsheet Documentation  Taken 9/4/2024 1420 by Magdalena Terry RN  Safety Promotion/Fall Prevention: safety round/check completed  Taken 9/4/2024 1238 by Magdalena Terry RN  Safety Promotion/Fall Prevention: safety round/check completed  Taken 9/4/2024 1030 by Magdalena Terry RN  Safety Promotion/Fall Prevention: safety round/check completed  Taken 9/4/2024 0803 by Magdalena Terry RN  Safety Promotion/Fall Prevention: safety round/check completed     Problem: Urinary Retention (Anesthesia/Analgesia, Neuraxial)  Goal: Effective Urinary Elimination  Outcome: Ongoing, Progressing     Problem: Pain Acute  Goal: Acceptable Pain Control and Functional Ability  Outcome: Ongoing, Progressing  Intervention: Develop Pain Management Plan  Recent Flowsheet Documentation  Taken 9/4/2024 1420 by Magdalena Terry RN  Pain Management Interventions: see MAR  Taken 9/4/2024 1238 by Madgalena Terry RN  Pain Management Interventions: see MAR  Taken 9/4/2024 0936 by Magdalena Terry RN  Pain Management Interventions: see MAR  Taken 9/4/2024 0803 by Magdalena Terry RN  Pain Management Interventions: see MAR     Problem: Fall Injury Risk  Goal: Absence of Fall and Fall-Related Injury  Outcome: Ongoing, Progressing  Intervention: Promote Injury-Free Environment  Recent Flowsheet Documentation  Taken 9/4/2024 1420 by Magdalena Terry RN  Safety Promotion/Fall Prevention: safety round/check completed  Taken 9/4/2024 1238 by Magdalena Terry RN  Safety Promotion/Fall Prevention: safety round/check completed  Taken 9/4/2024 1030 by Magdalena Terry RN  Safety Promotion/Fall Prevention: safety  round/check completed  Taken 2024 0803 by Magdalena Terry RN  Safety Promotion/Fall Prevention: safety round/check completed     Problem: Adjustment to Role Transition (Postpartum  Delivery)  Goal: Successful Maternal Role Transition  Outcome: Ongoing, Progressing     Problem: Bleeding (Postpartum  Delivery)  Goal: Hemostasis  Outcome: Ongoing, Progressing     Problem: Infection (Postpartum  Delivery)  Goal: Absence of Infection Signs and Symptoms  Outcome: Ongoing, Progressing     Problem: Pain (Postpartum  Delivery)  Goal: Acceptable Pain Control  Outcome: Ongoing, Progressing  Intervention: Prevent or Manage Pain  Recent Flowsheet Documentation  Taken 2024 1420 by Magdalena Terry RN  Pain Management Interventions: see MAR  Taken 2024 1238 by Magdalena Terry RN  Pain Management Interventions: see MAR  Taken 2024 0936 by Magdalena Terry RN  Pain Management Interventions: see MAR  Taken 2024 0803 by Magdalena Terry RN  Pain Management Interventions: see MAR     Problem: Postoperative Nausea and Vomiting (Postpartum  Delivery)  Goal: Nausea and Vomiting Relief  Outcome: Ongoing, Progressing     Problem: Postoperative Urinary Retention (Postpartum  Delivery)  Goal: Effective Urinary Elimination  Outcome: Ongoing, Progressing     Problem: Skin Injury Risk Increased  Goal: Skin Health and Integrity  Outcome: Ongoing, Progressing   Goal Outcome Evaluation:           Progress: improving  Outcome Evaluation: VSS. ABX administered throughout the day. Pain controlled with ERAS medications and prn merlene. Incision TIFFANIE, bleeding WNL, fundus firm and midline. Pumping ocassionally throughout the day. No new concerns at this time.

## 2024-09-04 NOTE — PROGRESS NOTES
[facility]   PROGRESS NOTE    Post-Op Day 4 S/P   Subjective     Patient reports:  Pain is adequately controlled. She is ambulating. Tolerating diet. Tolerating po -- normal.  Intake -- c/o of tolerating po solids.   Voiding - without difficulty;  Vaginal bleeding is light.      ROS:  Neuro: neg for headache/vision changes  Pulm: neg for soa  CV: neg for chest pain  : neg for heavy bleeding  Musculoskeletal: neg for leg pain    Objective      Vitals: Vital Signs Range for the last 24 hours  Temperature: Temp:  [97.9 °F (36.6 °C)-98.3 °F (36.8 °C)] 97.9 °F (36.6 °C)   Temp Source: Temp src: Oral   BP: BP: ()/(60-78) 119/65   Pulse: Heart Rate:  [] 77   Respirations: Resp:  [16-17] 17   SPO2:     O2 Amount (l/min):     O2 Devices Device (Oxygen Therapy): room air   Weight:              Physical Exam    Lungs clear to auscultation bilaterally   Abdomen Soft, fundus firm at U-1   Incision  no drainage, no erythema, no swelling, well approximated   Extremities Bilateral lower ext are without cords, tenderness or edema     Labs:   Lab Results   Component Value Date    WBC 11.05 (H) 2024    HGB 9.6 (L) 2024    HCT 28.3 (L) 2024    MCV 89.8 2024     2024       Assessment & Plan        Pregnancy    Previous  section    Footling breech presentation     premature rupture of membranes (PPROM) with onset of labor within 24 hours of rupture in third trimester, antepartum    S/P  section    Endometritis      Assessment:    Nessa Perales is Day 4  post-partum  , Low Transverse  : pt is currently stable and denies any issues.     Endometritis: afebrile over 24 hours, will continue ampicillin, clindamycin and gentamicin      Plan:  continue post op care.    Serbian interpretor service used for entire interaction.     Peggy Georges MD  2024  16:59 EDT

## 2024-09-04 NOTE — PROGRESS NOTES
"Discharge Planning Assessment  [facility]     Patient Name: Nessa Perales  MRN: [id number]  Today's Date: 9/4/2024    Admit Date: 8/31/2024        Discharge Needs Assessment    No documentation.                  Discharge Plan       Row Name 09/04/24 1048       Plan    Plan Comments Mother: Nessa Perales, MRN: [id number]; infant: Deonte \"Geri\" Diaz, MRN: [id number]. CSW consulted for \"NICU admission, premature infant, need .\" Of note, Of note, no toxicology screens were ordered for mother or infant as need was not warranted at this time. CSW has attempted to meet with mother multiple times, but mother has been unavailable to complete assessment. CSW left a packet of resources in Bengali in infant's NICU room including: WIC, HANDS, transportation, infant supplies, counseling, online support groups, postpartum mood and anxiety resources, NICU parent resources, and general community resources. CSW will continue to try and complete assessment with mother. CSW will remain available for psychosocial needs while infant is in the NICU. ALBINO Mullins.                  Continued Care and Services - Admitted Since 8/31/2024    No active coordination exists for this encounter.          Demographic Summary       Row Name 09/04/24 1048       General Information    Admission Type inpatient    Arrived From home    Referral Source nursing    Reason for Consult other (see comments)    General Information Comments NICU admission.                   Functional Status    No documentation.                  Psychosocial    No documentation.                  Abuse/Neglect    No documentation.                  Legal    No documentation.                  Substance Abuse    No documentation.                  Patient Forms    No documentation.                     SHAHANA Dale    "

## 2024-09-05 VITALS
RESPIRATION RATE: 16 BRPM | BODY MASS INDEX: 22.98 KG/M2 | DIASTOLIC BLOOD PRESSURE: 79 MMHG | TEMPERATURE: 98.1 F | HEART RATE: 102 BPM | WEIGHT: 143 LBS | OXYGEN SATURATION: 100 % | SYSTOLIC BLOOD PRESSURE: 121 MMHG | HEIGHT: 66 IN

## 2024-09-05 PROCEDURE — 0503F POSTPARTUM CARE VISIT: CPT | Performed by: OBSTETRICS & GYNECOLOGY

## 2024-09-05 PROCEDURE — 25010000002 AMPICILLIN PER 500 MG: Performed by: OBSTETRICS & GYNECOLOGY

## 2024-09-05 PROCEDURE — 25010000002 GENTAMICIN PER 80 MG: Performed by: OBSTETRICS & GYNECOLOGY

## 2024-09-05 PROCEDURE — 25010000002 CLINDAMYCIN 900 MG/50ML SOLUTION: Performed by: OBSTETRICS & GYNECOLOGY

## 2024-09-05 RX ORDER — HYDROCODONE BITARTRATE AND ACETAMINOPHEN 5; 325 MG/1; MG/1
1 TABLET ORAL EVERY 6 HOURS PRN
Qty: 10 TABLET | Refills: 0 | Status: SHIPPED | OUTPATIENT
Start: 2024-09-05

## 2024-09-05 RX ORDER — METRONIDAZOLE 500 MG/1
500 TABLET ORAL 2 TIMES DAILY
Qty: 10 TABLET | Refills: 0 | Status: SHIPPED | OUTPATIENT
Start: 2024-09-05 | End: 2024-09-10

## 2024-09-05 RX ORDER — IBUPROFEN 600 MG/1
600 TABLET, FILM COATED ORAL EVERY 6 HOURS PRN
Qty: 30 TABLET | Refills: 1 | Status: SHIPPED | OUTPATIENT
Start: 2024-09-05

## 2024-09-05 RX ORDER — CEPHALEXIN 500 MG/1
500 CAPSULE ORAL 4 TIMES DAILY
Qty: 20 CAPSULE | Refills: 0 | Status: SHIPPED | OUTPATIENT
Start: 2024-09-05 | End: 2024-09-10

## 2024-09-05 RX ADMIN — CLINDAMYCIN PHOSPHATE 900 MG: 900 INJECTION, SOLUTION INTRAVENOUS at 06:02

## 2024-09-05 RX ADMIN — OXYCODONE HYDROCHLORIDE 10 MG: 10 TABLET ORAL at 03:25

## 2024-09-05 RX ADMIN — ACETAMINOPHEN 325MG 650 MG: 325 TABLET ORAL at 06:41

## 2024-09-05 RX ADMIN — AMPICILLIN SODIUM 2 G: 2 INJECTION, POWDER, FOR SOLUTION INTRAMUSCULAR; INTRAVENOUS at 12:12

## 2024-09-05 RX ADMIN — IBUPROFEN 600 MG: 600 TABLET, FILM COATED ORAL at 02:40

## 2024-09-05 RX ADMIN — AMPICILLIN SODIUM 2 G: 2 INJECTION, POWDER, FOR SOLUTION INTRAMUSCULAR; INTRAVENOUS at 06:41

## 2024-09-05 RX ADMIN — GENTAMICIN SULFATE 320 MG: 40 INJECTION, SOLUTION INTRAMUSCULAR; INTRAVENOUS at 04:57

## 2024-09-05 RX ADMIN — ACETAMINOPHEN 325MG 650 MG: 325 TABLET ORAL at 12:12

## 2024-09-05 RX ADMIN — OXYCODONE HYDROCHLORIDE 10 MG: 10 TABLET ORAL at 09:45

## 2024-09-05 RX ADMIN — DOCUSATE SODIUM 100 MG: 100 CAPSULE, LIQUID FILLED ORAL at 09:45

## 2024-09-05 RX ADMIN — SIMETHICONE 80 MG: 80 TABLET, CHEWABLE ORAL at 02:40

## 2024-09-05 NOTE — PLAN OF CARE
Problem: Adult Inpatient Plan of Care  Goal: Plan of Care Review  Outcome: Ongoing, Progressing  Flowsheets  Taken 9/4/2024 2315 by Jeanna Valdez RN  Outcome Evaluation: VS WNL, pain well controlled w po meds, pt would like to dc 9/5/24  Taken 9/4/2024 1536 by Magdalena Terry, RN  Progress: improving  Taken 9/3/2024 1409 by Luiza Santana, RN  Plan of Care Reviewed With: patient  Goal: Patient-Specific Goal (Individualized)  Outcome: Ongoing, Progressing  Goal: Absence of Hospital-Acquired Illness or Injury  Outcome: Ongoing, Progressing  Intervention: Identify and Manage Fall Risk  Description: Perform standard risk assessment on admission using a validated tool or comprehensive approach appropriate to the patient; reassess fall risk frequently, with change in status or transfer to another level of care.  Communicate fall injury risk to interprofessional healthcare team.  Determine need for increased observation, equipment and environmental modification, such as low bed, signage and supportive, nonskid footwear.  Adjust safety measures to individual developmental age, stage and identified risk factors.  Reinforce the importance of safety and physical activity with patient and family.  Perform regular intentional rounding to assess need for position change, pain assessment and personal needs, including assistance with toileting.  Recent Flowsheet Documentation  Taken 9/4/2024 2241 by Jeanna Valdez RN  Safety Promotion/Fall Prevention: safety round/check completed  Taken 9/4/2024 2200 by Jeanna Valdez RN  Safety Promotion/Fall Prevention: (in NICU) patient off unit  Taken 9/4/2024 2030 by Jeanna Valdez RN  Safety Promotion/Fall Prevention: (in NICU) patient off unit  Intervention: Prevent and Manage VTE (Venous Thromboembolism) Risk  Description: Assess for VTE (venous thromboembolism) risk.  Encourage and assist with early ambulation.  Initiate  and maintain compression or other therapy, as indicated, based on identified risk in accordance with organizational protocol and provider order.  Encourage both active and passive leg exercises while in bed, if unable to ambulate.  Recent Flowsheet Documentation  Taken 9/4/2024 2241 by Jeanna Valdez RN  Activity Management:   up ad fabiano   ambulated outside room  Goal: Optimal Comfort and Wellbeing  Outcome: Ongoing, Progressing  Intervention: Monitor Pain and Promote Comfort  Description: Assess pain level, treatment efficacy and patient response at regular intervals using a consistent pain scale.  Consider the presence and impact of preexisting chronic pain.  Encourage patient and caregiver involvement in pain assessment, interventions and safety measures.  Recent Flowsheet Documentation  Taken 9/4/2024 2241 by Jeanna Valdez RN  Pain Management Interventions: see MAR  Intervention: Provide Person-Centered Care  Description: Use a family-focused approach to care.  Develop trust and rapport by proactively providing information, encouraging questions, addressing concerns and offering reassurance.  Acknowledge emotional response to hospitalization.  Recognize and utilize personal coping strategies.  Honor spiritual and cultural preferences.  Recent Flowsheet Documentation  Taken 9/4/2024 2241 by Jeanna Valdez RN  Trust Relationship/Rapport: care explained  Goal: Readiness for Transition of Care  Outcome: Ongoing, Progressing   Goal Outcome Evaluation:              Outcome Evaluation: VS WNL, pain well controlled w po meds, pt would like to dc 9/5/24

## 2024-09-05 NOTE — PROGRESS NOTES
[facility]   Obstetrics and Gynecology     2024    Name:Nessa Perales    MR#:[id number]     Progress Note:  Post-Op    HD:5    Subjective   29 y.o. yo Female  s/p CS at 27w3d doing well. Pain well controlled. Tolerating regular diet and having flatus. Lochia normal.     Patient reports feeling fine and want to go home.     Patient Active Problem List   Diagnosis    Prenatal care, subsequent pregnancy, second trimester    Low platelet count    Prior pregnancy complicated by PIH, antepartum    Pregnancy    Previous  section    Footling breech presentation     premature rupture of membranes (PPROM) with onset of labor within 24 hours of rupture in third trimester, antepartum    S/P  section    Endometritis        Objective    Vitals  Temp:  Temp:  [97.9 °F (36.6 °C)-98.6 °F (37 °C)] 98.1 °F (36.7 °C)  Temp src: Oral  BP:  BP: (108-126)/(64-79) 121/79  Pulse:  Heart Rate:  [] 102  RR:   Resp:  [16-18] 16  Weight: 64.9 kg (143 lb)  BMI:  Body mass index is 23.08 kg/m².    Physical Exam  Vitals and nursing note reviewed.   Constitutional:       General: She is not in acute distress.     Appearance: Normal appearance. She is well-developed. She is not ill-appearing.   HENT:      Head: Normocephalic.   Pulmonary:      Effort: Pulmonary effort is normal.   Abdominal:      General: Abdomen is flat. There is no distension.      Palpations: Abdomen is soft. There is no mass.      Tenderness: There is no abdominal tenderness.      Comments: Fundus is non-tender     Genitourinary:     Vagina: Normal.      Comments: Lochia is scant  Fundus is firm    Incision:  dry/clean/intact  Musculoskeletal:         General: No swelling or tenderness.   Neurological:      Mental Status: She is alert and oriented to person, place, and time.   Psychiatric:         Behavior: Behavior normal.         Thought Content: Thought content normal.         Judgment: Judgment normal.          No intake/output data recorded.    LABS:  Results from last 7 days   Lab Units 24  0702 24  0741 24  0602   WBC 10*3/mm3 11.05* 11.05* 19.11*   HEMOGLOBIN g/dL 9.6* 9.2* 9.9*   HEMATOCRIT % 28.3* 27.8* 29.8*   PLATELETS 10*3/mm3 157 147 136*     Results from last 7 days   Lab Units 24  0911   SODIUM mmol/L 136   POTASSIUM mmol/L 3.5   CHLORIDE mmol/L 102   CO2 mmol/L 21.3*   BUN mg/dL 4*   CREATININE mg/dL 0.47*   CALCIUM mg/dL 8.9   BILIRUBIN mg/dL 0.5   ALK PHOS U/L 60   ALT (SGPT) U/L 14   AST (SGOT) U/L 18   GLUCOSE mg/dL 88       Infant: female       Assessment    1.  POD#5     Pregnancy    Previous  section    Footling breech presentation     premature rupture of membranes (PPROM) with onset of labor within 24 hours of rupture in third trimester, antepartum    S/P  section    Endometritis    Marked improvement in abdominal pain.  The patient feel well is very eager for discharge.     Treatment plan and precaution discussed with the patient in Panamanian.     Plan:  Discharge today   Oral antibiotics for 5 additional days.   1 week office follow-up     Sukumar Garvin MD  2024 11:21 EDT

## 2024-09-05 NOTE — NURSING NOTE
Reviewed AVS with pt at bedside. All d/c instructions reviewed with pt including, but not limited to, restrictions, medications, incision care, maternal warning signs, and f/u appointment date.  was used per pt request. All questions were answered, pt voices understanding. Meds have arrived from pharmacy, IV removed, pt is waiting on ride at this time.

## 2024-09-06 NOTE — DISCHARGE SUMMARY
[facility]   Obstetrics and Gynecology    Section Discharge Summary    Date of Admission: 2024  Date of Discharge:  2024      Patient: Nessa Perales      MR#:[id number]    Surgeon/OB: Sukumar Garvin MD    Discharge Diagnosis:    section at 27w3d, uncomplicated recovery    S/P  section    Pregnancy    Previous  section    Footling breech presentation     premature rupture of membranes (PPROM) with onset of labor within 24 hours of rupture in third trimester, antepartum    Endometritis        Procedures:  , Low Transverse     2024    10:17 AM      Anesthesia:  Spinal     Hospital Course  Patient is a 29 y.o. female  at 27w3d who presented with  premature rupture of membranes and  labor and breech presentation and underwent emergent .  On postoperative day 1 the patient developed fever and mild abdominal tenderness and was started on antibiotics.  She responded well and remained afebrile throughout the entirety of her hospital stay and was discharged home on postoperative day 5.    Due to prematurity the infant was admitted to the NICU and remained so at discharge    Patient was advanced to regular diet on postoperative day#1.  On discharge, ambulating, tolerating a regular diet without any difficulties and her incision is dry, clean and intact.     Infant:   female  fetus 1170 g (2 lb 9.3 oz)  with Apgar scores of 3 , 7  at five minutes.    Condition on Discharge:  Stable    Vital Signs       Results from last 7 days   Lab Units 24  0702 24  0741 24  0602   WBC 10*3/mm3 11.05* 11.05* 19.11*   HEMOGLOBIN g/dL 9.6* 9.2* 9.9*   HEMATOCRIT % 28.3* 27.8* 29.8*   PLATELETS 10*3/mm3 157 147 136*     Results from last 7 days   Lab Units 24  0911   SODIUM mmol/L 136   POTASSIUM mmol/L 3.5   CHLORIDE mmol/L 102   CO2 mmol/L 21.3*   BUN mg/dL 4*   CREATININE mg/dL 0.47*   CALCIUM mg/dL 8.9    BILIRUBIN mg/dL 0.5   ALK PHOS U/L 60   ALT (SGPT) U/L 14   AST (SGOT) U/L 18   GLUCOSE mg/dL 88         Discharge Disposition  Home or Self Care    Discharge Medications     Your medication list        START taking these medications        Instructions Last Dose Given Next Dose Due   cephalexin 500 MG capsule  Commonly known as: Keflex      Take 1 capsule by mouth 4 (Four) Times a Day for 5 days.       HYDROcodone-acetaminophen 5-325 MG per tablet  Commonly known as: Norco      [place] 1 tableta oral cada 6 horas marcellus sea necesario para dolor moderado o severo  (Take 1 tablet by mouth Every 6 (Six) Hours As Needed for Moderate Pain or Severe Pain.)       ibuprofen 600 MG tablet  Commonly known as: ADVIL,MOTRIN      [place] 1 tableta oral cada 6 horas marcellus sea necesario para dolor moderado  (Take 1 tablet by mouth Every 6 (Six) Hours As Needed for Moderate Pain.)       metroNIDAZOLE 500 MG tablet  Commonly known as: FLAGYL      [place] 1 tableta por via oral 2 (dos) veces al brittni por 5 dean  (Take 1 tablet by mouth 2 (Two) Times a Day for 5 days.)              CONTINUE taking these medications        Instructions Last Dose Given Next Dose Due   prenatal vitamin 27-0.8 27-0.8 MG tablet tablet      [place] 1 tableta por via oral diariamente.  (Take 1 tablet by mouth Daily.)                 Where to Get Your Medications        These medications were sent to [facility] [place] [address]      Hours: Monday to Friday 7 AM to 6 PM, Saturday & Sunday 8 AM to 4:30 PM (Closed 12 PM to 12:30 PM) Phone: [phone]   cephalexin 500 MG capsule  HYDROcodone-acetaminophen 5-325 MG per tablet  ibuprofen 600 MG tablet  metroNIDAZOLE 500 MG tablet           Discharge Diet: Regular    Follow-up Appointments  Future Appointments   Date Time Provider Department Center   9/9/2024 10:15 AM Eric Bautista MD [facility]     Additional Instructions for the Follow-ups that You Need to Schedule        Call MD for problems / concerns.   As directed      Call for any problems with  1.  Heavy vaginal bleeding (greater than 2 pads an hours for 2 hours)  2.  Fever above 101.3  3.  Incisional redness  4.  Signs of Preeclampsia:  headache, visual changes or elevated blood pressure    Order Comments: Call for any problems with 1.  Heavy vaginal bleeding (greater than 2 pads an hours for 2 hours) 2.  Fever above 101.3 3.  Incisional redness 4.  Signs of Preeclampsia:  headache, visual changes or elevated blood pressure                 Prenatal labs/vax:   Immunization History   Administered Date(s) Administered    Flublok 18+yrs 10/18/2023    Tdap 10/18/2023       External Prenatal Results       Pregnancy Outside Results - Transcribed From Office Records - See Scanned Records For Details       Test Value Date Time    ABO  O  08/31/24 0911    Rh  Positive  08/31/24 0911    Antibody Screen  Negative  08/31/24 0911       Negative  04/10/24 1131       Negative  04/08/24 2305    Varicella IgG  3,612 index 04/10/24 1131    Rubella  1.54 index 04/10/24 1131    Hgb  9.6 g/dL 09/04/24 0702       9.2 g/dL 09/03/24 0741       9.9 g/dL 09/01/24 0602       12.9 g/dL 08/31/24 0911       12.7 g/dL 05/29/24 1948       12.3 g/dL 05/24/24 1829       13.8 g/dL 04/10/24 1131       12.7 g/dL 04/08/24 2305       13.2 g/dL 03/26/24 1840    Hct  28.3 % 09/04/24 0702       27.8 % 09/03/24 0741       29.8 % 09/01/24 0602       39.1 % 08/31/24 0911       38.7 % 05/29/24 1948       37.5 % 05/24/24 1829       41.7 % 04/10/24 1131       38.5 % 04/08/24 2305       40.8 % 03/26/24 1840    HgB A1c   5.3 % 04/10/24 1131    1h GTT       3h GTT Fasting       3h GTT 1 hour       3h GTT 2 hour       3h GTT 3 hour        Gonorrhea (discrete)  Negative  08/07/24 0939       Negative  04/10/24 1441    Chlamydia (discrete)  Negative  08/07/24 0939       Negative  04/10/24 1441    RPR  Non Reactive  04/10/24 1131    Syphils cascade: TP-Ab (FTA)  Non-Reactive   08/31/24 0911    TP-Ab       TP-Ab (EIA)       TPPA       HBsAg  Negative  04/10/24 1131    Herpes Simplex Virus PCR       Herpes Simplex VIrus Culture       HIV  Non Reactive  04/10/24 1131    Hep C RNA Quant PCR       Hep C Antibody  Non Reactive  04/10/24 1131    AFP       NIPT       Cystic Fibroisis        Group B Strep       GBS Susceptibility to Clindamycin       GBS Susceptibility to Erythromycin       Fetal Fibronectin       Genetic Testing, Maternal Blood                 Drug Screening       Test Value Date Time    Urine Drug Screen       Amphetamine Screen       Barbiturate Screen       Benzodiazepine Screen       Methadone Screen       Phencyclidine Screen       Opiates Screen       THC Screen       Cocaine Screen       Propoxyphene Screen       Buprenorphine Screen       Methamphetamine Screen       Oxycodone Screen       Tricyclic Antidepressants Screen                 Legend    ^: Historical                              Sukumar Garvin MD  9/6/2024  11:30 EDT

## 2024-09-09 ENCOUNTER — POSTPARTUM VISIT (OUTPATIENT)
Dept: OBSTETRICS AND GYNECOLOGY | Age: 29
End: 2024-09-09
Payer: COMMERCIAL

## 2024-09-09 VITALS
DIASTOLIC BLOOD PRESSURE: 64 MMHG | WEIGHT: 132 LBS | SYSTOLIC BLOOD PRESSURE: 120 MMHG | BODY MASS INDEX: 21.21 KG/M2 | HEIGHT: 66 IN

## 2024-09-09 DIAGNOSIS — Z98.890 POST-OPERATIVE STATE: Primary | ICD-10-CM

## 2024-09-09 PROCEDURE — 0503F POSTPARTUM CARE VISIT: CPT | Performed by: OBSTETRICS & GYNECOLOGY

## 2024-09-09 NOTE — PROGRESS NOTES
Subjective       History of Present Illness  Nessa Perales is a 29 y.o. female is being seen today for incision check following the delivery of her daughter Geri on   Patient had spontaneous rupture membranes at 27 weeks.  Underwent  and did well.  Infant is improving and Jil's has no complaints.  Chief Complaint   Patient presents with    Post-op Follow-up     1 wk pp incision check: Del.24,,pumping,Geri,2lbs.,9oz.female   .        The following portions of the patient's history were reviewed and updated as appropriate: allergies, current medications, past family history, past medical history, past social history, past surgical history and problem list.    PAST MEDICAL HISTORY  Past Medical History:   Diagnosis Date    Preeclampsia      OB History    Para Term  AB Living   4 3 2 1 1 3   SAB IAB Ectopic Molar Multiple Live Births           0 3      # Outcome Date GA Lbr Mynor/2nd Weight Sex Type Anes PTL Lv   4  24 27w3d  1170 g (2 lb 9.3 oz) F CS-LTranv Spinal Y JOAN      Birth Comments: panda OR 1      Complications:  premature rupture of membranes (PPROM) with unknown onset of labor   3 Term  40w0d   M CS-LTranv   JOAN   2 Term  40w0d   F Vag-Spont   JOAN   1 AB  5w0d            Past Surgical History:   Procedure Laterality Date     SECTION       SECTION N/A 2024    Procedure:  SECTION REPEAT;  Surgeon: Sukumar Garvin MD;  Location: [facility] LABOR DELIVERY;  Service: Obstetrics/Gynecology;  Laterality: N/A;     Family History   Problem Relation Age of Onset    Breast cancer Maternal Aunt 43     Social History     Tobacco Use   Smoking Status Never   Smokeless Tobacco Never       Current Outpatient Medications:     cephalexin (Keflex) 500 MG capsule, Take 1 capsule by mouth 4 (Four) Times a Day for 5 days., Disp: 20 capsule, Rfl: 0    HYDROcodone-acetaminophen (Norco) 5-325 MG per tablet, Take 1 tablet  by mouth Every 6 (Six) Hours As Needed for Moderate Pain or Severe Pain., Disp: 10 tablet, Rfl: 0    ibuprofen (ADVIL,MOTRIN) 600 MG tablet, Take 1 tablet by mouth Every 6 (Six) Hours As Needed for Moderate Pain., Disp: 30 tablet, Rfl: 1    metroNIDAZOLE (FLAGYL) 500 MG tablet, Take 1 tablet by mouth 2 (Two) Times a Day for 5 days., Disp: 10 tablet, Rfl: 0    Prenatal Vit-Fe Fumarate-FA (Prenatal Vitamin) 27-0.8 MG tablet, Take 1 tablet by mouth Daily. (Patient not taking: Reported on 9/9/2024), Disp: 30 tablet, Rfl: 12  Immunization History   Administered Date(s) Administered    Flublok 18+yrs 10/18/2023    Tdap 10/18/2023       Review of Systems       Except as outlined in history of physical illness, patient denies any changes in her GYN, , GI systems. All other systems reviewed are negative.    Objective   Physical Exam   Alert and oriented, respirations unlabored, heart regular rate and rhythm   Pelvic deferred  Abdomen soft nontender incisions clean dry intact healing well  Breast exam was normal and reassuring no lymphadenopathy masses.        Assessment & Plan   Diagnoses and all orders for this visit:    1. Post-operative state (Primary)    Reassuring clinical exam today, patient to come back for normal postpartum visit but will call with any change in her condition or any questions             No orders of the defined types were placed in this encounter.          EMR Dragon/ Transcription disclaimer:  Much of the encounter note is an electronic transcription/translation of spoken language to printed text. The electronic translation of spoken language may permit erroneous, or at times, nonessential words or phrases to be inadvertently transcribes; Although i have reviewed the note for such errors, some may still exist.

## 2024-09-13 ENCOUNTER — TELEPHONE (OUTPATIENT)
Dept: OBSTETRICS AND GYNECOLOGY | Age: 29
End: 2024-09-13
Payer: COMMERCIAL

## 2024-09-13 ENCOUNTER — TELEPHONE (OUTPATIENT)
Dept: OBSTETRICS AND GYNECOLOGY | Age: 29
End: 2024-09-13

## 2024-09-13 NOTE — TELEPHONE ENCOUNTER
Pt called back, she said [employer] faxed over the paper work today and she just wants to make sure you will call her when you are finished.

## 2024-09-13 NOTE — TELEPHONE ENCOUNTER
Caller: Nessa Perales    Relationship to patient: Self    Best call back number: [phone] (home)       Patient is needing: FMLA PAPER WORK AND ASKING ABOUT GETTING BACK ON BIRTH CONTROL MEDICATION.    PATIENT WAS SEEN ON 9/9/24 FOR 1 WEEK POSTPARTUM VISIT AND HAS ANOTHER VISIT COMING UP IN OCTOBER. PATIENT WOULD LIKE A CALL BACK FROM SOMEONE IN THE OFFICE ABOUT THE FMLA PAPER WORK- UNSURE OF WHAT SHE NEEDS AND WANTS TO SPEAK WITH SOMEONE ABOUT GETTING BIRTH CONTROL MEDICATION STARTED.    WILL NEED A - MAY CALL AT ANY TIME.

## 2024-09-13 NOTE — TELEPHONE ENCOUNTER
PT wants to make sure we have her [facility] paperwork and will call her when finished

## 2024-09-13 NOTE — TELEPHONE ENCOUNTER
----- Message from [facility] [employer] sent at 9/12/2024  8:50 PM EDT -----  Regarding: Appointment Request  Contact: [phone]  Appointment Request From: Nessa Perales    With Provider: Eric Bautista [[facility]]    Preferred Date Range: 9/16/2024 – 9/20/2024    Preferred Times: Any Time    Reason for visit: In-Office Procedure    Comments:  Prevenir un embarazo,métodos anticoncepción

## 2024-09-13 NOTE — TELEPHONE ENCOUNTER
----- Message from [facility] sent at 9/12/2024  8:55 PM EDT -----  Regarding: Appointment Request  Contact: [phone]  Appointment Request From: Nessa Perales    With Provider: Lorraine Kirby [[facility]]    Preferred Date Range: 9/12/2024 – 9/20/2024    Preferred Times: Any Time    Reason for visit: In-Office Procedure    Comments:  Método anticomsepcion

## 2024-09-13 NOTE — TELEPHONE ENCOUNTER
I returned PT call with  about FMLA and birth control. I am not understanding PT when it comes to the FMLA. PT states that she needs a certificate from the Dr here. PT delivered early by  27wks. PT was advised that her work may have [facility] paperwork to fill out. PT did not understand what I was talking about. How else can I help this patient? She is scheduled for her postpartum visit on 10/15

## 2024-09-18 ENCOUNTER — MATERNAL SCREENING (OUTPATIENT)
Dept: CALL CENTER | Facility: HOSPITAL | Age: 29
End: 2024-09-18
Payer: COMMERCIAL

## 2024-09-19 NOTE — TELEPHONE ENCOUNTER
Caller: Nessa Perales     Relationship to patient: Self     Best call back number: [phone] (home)         Patient is needing: PT CALLING FOR AN UPDATE ON THE [facility] PAPER THAT SHOULD HAVE BEEN FAXED TO THE OFFICE - PLEASE CALL THE PT TO CONFIRM    THANK YOU!

## 2024-09-20 ENCOUNTER — TELEPHONE (OUTPATIENT)
Dept: OBSTETRICS AND GYNECOLOGY | Age: 29
End: 2024-09-20
Payer: COMMERCIAL

## 2024-09-23 ENCOUNTER — TELEPHONE (OUTPATIENT)
Dept: SURGERY | Facility: OTHER | Age: 29
End: 2024-09-23

## 2024-09-23 ENCOUNTER — TELEPHONE (OUTPATIENT)
Dept: OBSTETRICS AND GYNECOLOGY | Age: 29
End: 2024-09-23
Payer: COMMERCIAL

## 2024-09-23 DIAGNOSIS — Z98.891 S/P CESAREAN SECTION: ICD-10-CM

## 2024-09-23 NOTE — TELEPHONE ENCOUNTER
Caller: Sukhdevdelta Perales       Relationship: SELF     Best call back number:  [phone]     What is the best time to reach you:  ANYTIME     Who are you requesting to speak with (clinical staff, provider,  specific staff member):  CLINICAL         What was the call regarding:  PT IS REQUESTED FMLA PAPERWORK TO BE FAXED TO [employer] FAX # [phone]. PT STATES HER PAPERWORK WAS FAXED FRIDAY AND IS DUE TODAY -PT STATES SHE SPOKE WITH OFFICE FRIDAY AND SHE IS WANTING TO MAKE SURE ITS TAKEN CARE OF  ALSO PT WANTED TO FOLLOW UP ON HER  PAIN MEDICATION THAT HAS NOT BEEN FILLED -  MEDICATION IS NEEDING APPROVAL . PT IS REQUESTING A CALL BACK  IN REGARDS TO FMLA PAPERWORK AS WELL AS MEDICATION REFILL .

## 2024-09-24 RX ORDER — METRONIDAZOLE 500 MG/1
500 TABLET ORAL 2 TIMES DAILY
Qty: 10 TABLET | Refills: 0 | OUTPATIENT
Start: 2024-09-24 | End: 2024-09-29

## 2024-09-24 RX ORDER — CEPHALEXIN 500 MG/1
500 CAPSULE ORAL 4 TIMES DAILY
Qty: 20 CAPSULE | Refills: 0 | OUTPATIENT
Start: 2024-09-24 | End: 2024-09-29

## 2024-09-24 RX ORDER — HYDROCODONE BITARTRATE AND ACETAMINOPHEN 5; 325 MG/1; MG/1
1 TABLET ORAL EVERY 6 HOURS PRN
Qty: 10 TABLET | Refills: 0 | OUTPATIENT
Start: 2024-09-24

## 2024-10-01 ENCOUNTER — POSTPARTUM VISIT (OUTPATIENT)
Dept: OBSTETRICS AND GYNECOLOGY | Age: 29
End: 2024-10-01
Payer: COMMERCIAL

## 2024-10-01 VITALS
HEIGHT: 66 IN | WEIGHT: 130 LBS | SYSTOLIC BLOOD PRESSURE: 110 MMHG | DIASTOLIC BLOOD PRESSURE: 68 MMHG | BODY MASS INDEX: 20.89 KG/M2

## 2024-10-01 DIAGNOSIS — Z12.4 SCREENING FOR CERVICAL CANCER: ICD-10-CM

## 2024-10-01 NOTE — PROGRESS NOTES
"Subjective     Chief Complaint   Patient presents with    Postpartum Care     PP, c-sect, discuss BC  CC:  no problems       Nessa Perales is a 29 y.o.  whose LMP is Patient's last menstrual period was 2024 (exact date).     Pt presents today for routine postpartum check up  She wants to discuss birth control options  She is s/p  section after SROM at 27+3  Denies postpartum depression.   She is having occl light bleeding  Pumping - baby still in NICU. Baby is doing well and gaining weight.  She is due for a pap smear      No Additional Complaints Reported    The following portions of the patient's history were reviewed and updated as appropriate:vital signs, allergies, current medications, past medical history, past social history, past surgical history, and problem list      Review of Systems   Pertinent items are noted in HPI.     Objective      /68   Ht 167.6 cm (66\")   Wt 59 kg (130 lb)   LMP 2024 (Exact Date)   Breastfeeding Yes Comment: pumping breast milk  BMI 20.98 kg/m²     Physical Exam    General:   alert and no distress   Heart: Not performed today   Lungs: Not performed today.   Breast: na   Neck: na   Abdomen: Incision C/D/I   CVA: Not performed today   Pelvis: External genitalia: normal general appearance  Urinary system: urethral meatus normal  Vaginal: normal mucosa without prolapse or lesions, normal without tenderness, induration or masses, and normal rugae  Cervix: normal appearance   Extremities: Not performed today   Neurologic: negative   Psychiatric: Normal affect, judgement, and mood       Lab Review   Labs: No data reviewed     Imaging   No data reviewed    Assessment & Plan     ASSESSMENT  1. Postpartum examination following  delivery    2. Screening for cervical cancer        PLAN  1. No orders of the defined types were placed in this encounter.      2. Medications prescribed this encounter:      No orders of the defined types were placed " in this encounter.      3. Normal exam. Pap smear done. Discussed contraception options. Pt would like to proceed with nexplanon. Discussed potential of irregular menses. No IC until placement. Will sign PW today and return next week for placement.    Follow up: 1 week(s)    ADRIAN Delgado  10/1/2024

## 2024-10-06 LAB
CYTOLOGIST CVX/VAG CYTO: NORMAL
CYTOLOGY CVX/VAG DOC CYTO: NORMAL
CYTOLOGY CVX/VAG DOC THIN PREP: NORMAL
DX ICD CODE: NORMAL
HPV I/H RISK 4 DNA CVX QL PROBE+SIG AMP: NEGATIVE
Lab: NORMAL
OTHER STN SPEC: NORMAL
STAT OF ADQ CVX/VAG CYTO-IMP: NORMAL

## 2024-10-09 ENCOUNTER — OFFICE VISIT (OUTPATIENT)
Dept: OBSTETRICS AND GYNECOLOGY | Age: 29
End: 2024-10-09
Payer: COMMERCIAL

## 2024-10-09 ENCOUNTER — LACTATION ENCOUNTER (OUTPATIENT)
Dept: NURSERY | Facility: HOSPITAL | Age: 29
End: 2024-10-09

## 2024-10-09 VITALS
BODY MASS INDEX: 21.38 KG/M2 | DIASTOLIC BLOOD PRESSURE: 68 MMHG | SYSTOLIC BLOOD PRESSURE: 104 MMHG | WEIGHT: 133 LBS | HEIGHT: 66 IN

## 2024-10-09 DIAGNOSIS — Z30.017 NEXPLANON INSERTION: Primary | ICD-10-CM

## 2024-10-09 DIAGNOSIS — Z01.812 PRE-PROCEDURE LAB EXAM: ICD-10-CM

## 2024-10-09 LAB
B-HCG UR QL: NEGATIVE
EXPIRATION DATE: NORMAL
INTERNAL NEGATIVE CONTROL: NORMAL
INTERNAL POSITIVE CONTROL: NORMAL
Lab: NORMAL

## 2024-10-09 NOTE — LACTATION NOTE
This note was copied from a baby's chart.  Bedside RN states mom has returned to work & that her milk supply has decreased & asking for LC to round on mom while she is at infant's bedside.   #[id number] assisted.  Mom states that she has been pumping once a day, in the morning, for the past 3 weeks & reports she is only getting 1 drop of milk.  She states that she previously was pumping twice/day & getting what she thought was good amounts of milk.  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  Informed her that pumping q3hrs or 8x/24 hrs is the stimulation needed to establish/reestablish milk supply & that she may not really be able to bring back her supply to the fullest it could be.  She verbalized understanding.  Provided reassurance that her baby will be fed with formula if she is unable to increase her milk supply.

## 2024-10-09 NOTE — PROGRESS NOTES
Nexplanon Insertion    Patient's last menstrual period was 10/06/2024 (exact date).    Date of procedure:  10/9/2024    Risks and benefits discussed? yes  All questions answered? yes  Consents given by the patient  Written consent obtained? yes    Local anesthesia used:  yes    Procedure documentation:     Urine pregnancy test was done and was NEGATIVE .  The risks (including infection,  bruising, irregular bleeding, pain at insertion site, and injury to muscles, nerves  and blood vessesl) and benefits of the procedure were explained to the patient  and/or guardian and Written informed consent was obtained. She especially  understands that her menstrual periods are expected to become irregular and  unpredictable throughout the time she is using the implant.  She has no  contraindications to the insertion.  Her questions have been answered.  She has  fully reviewed the FDA-approved consent brochure, has signed the consent  form, and wishes to proceed with the insertion today.     The upper left arm (non-dominant) was marked at the intended site of insertion.  Betadine was used to cleanse the skin.  1 % lidocaine without epinephrine was injected.  The Nexplanon was placed subdermally without difficulty and according to manufacturers instructions.  The device was able to be palpated in the arm by both myself and the patient.  Steri-strips were then placed across the site of insertion and a pressure bandage was applied.    She tolerated the procedure well.  There were no complications.  EBL was minimal.    Post procedure instructions:          The patient was advised to call for any rash, arm pain, fever, warmth or for prolonged bruising or bleeding. Remove the wrapping in 24 hours and the steri-strips in 5 days.    Follow up needed: PRN

## 2024-11-18 ENCOUNTER — OFFICE VISIT (OUTPATIENT)
Dept: OBSTETRICS AND GYNECOLOGY | Age: 29
End: 2024-11-18
Payer: COMMERCIAL

## 2024-11-18 VITALS
DIASTOLIC BLOOD PRESSURE: 70 MMHG | SYSTOLIC BLOOD PRESSURE: 112 MMHG | BODY MASS INDEX: 20.89 KG/M2 | HEIGHT: 66 IN | WEIGHT: 130 LBS

## 2024-11-18 DIAGNOSIS — Z30.46 NEXPLANON REMOVAL: Primary | ICD-10-CM

## 2024-11-18 DIAGNOSIS — Z30.011 ENCOUNTER FOR BCP (BIRTH CONTROL PILLS) INITIAL PRESCRIPTION: ICD-10-CM

## 2024-11-18 DIAGNOSIS — Z97.5 BREAKTHROUGH BLEEDING ON NEXPLANON: ICD-10-CM

## 2024-11-18 DIAGNOSIS — N92.1 BREAKTHROUGH BLEEDING ON NEXPLANON: ICD-10-CM

## 2024-11-18 PROCEDURE — 11982 REMOVE DRUG IMPLANT DEVICE: CPT

## 2024-11-18 PROCEDURE — 99213 OFFICE O/P EST LOW 20 MIN: CPT

## 2024-11-18 PROCEDURE — 1160F RVW MEDS BY RX/DR IN RCRD: CPT

## 2024-11-18 PROCEDURE — 1159F MED LIST DOCD IN RCRD: CPT

## 2024-11-18 RX ORDER — DROSPIRENONE 4 MG/1
1 TABLET, FILM COATED ORAL DAILY
Qty: 28 TABLET | Refills: 2 | Status: SHIPPED | OUTPATIENT
Start: 2024-11-18

## 2024-11-18 NOTE — PROGRESS NOTES
"Subjective     History of Present Illness  Nessa Perales is a 29 y.o.  female is being seen today for   Chief Complaint   Patient presents with    Follow-up     Gyn f/u Irregular cycles- wants to discuss other bc options before getting nexplanon out     C/o irregular bleeding with Nexplanon.Nexplanon inserted L arm on 10/9/2024.  Reports her period has not stopped since having Nexplanon inserted 1 month ago.  Bleeding is light, changing tampon/pad about twice a day but is occurring daily.  No vaginal itching or vaginal odor.  No new sex partners.  Not breast-feeding.  She is interested in a different birth control so that bleeding will stop.  She has tried Depo shot injections, pills, and condoms in the past.  Believes she is allergic to condoms. Reports that with pills and depo shot she also bleed continuously. Has migraines with visual changes often. No personal hx blood clots.  Declines STD testing.  Medical  was used for the visit.     The following portions of the patient's history were reviewed and updated as appropriate: allergies, current medications, past family history, past medical history, past social history, past surgical history and problem list.    /70   Ht 167.6 cm (66\")   Wt 59 kg (130 lb)   LMP 2024 (Exact Date)   Breastfeeding No   BMI 20.98 kg/m²         Review of Systems   Constitutional: Negative.    HENT: Negative.     Eyes: Negative.    Respiratory: Negative.     Cardiovascular: Negative.    Gastrointestinal: Negative.    Endocrine: Negative.    Genitourinary:  Positive for menstrual problem.   Musculoskeletal: Negative.    Skin: Negative.    Allergic/Immunologic: Negative.    Neurological: Negative.    Hematological: Negative.    Psychiatric/Behavioral: Negative.         Objective   Physical Exam  Constitutional:       General: She is not in acute distress.  Cardiovascular:      Rate and Rhythm: Normal rate and regular rhythm.      Pulses: Normal " pulses.      Heart sounds: Normal heart sounds.   Pulmonary:      Effort: Pulmonary effort is normal.      Breath sounds: Normal breath sounds.   Genitourinary:     Exam position: Lithotomy position.      Labia:         Right: No rash, tenderness or lesion.         Left: No rash, tenderness or lesion.       Vagina: Normal.      Cervix: Cervical bleeding (consistent with menses noted) present.   Musculoskeletal:      Comments: Nexplanon palpated on L arm. Nexplanon removed successfully, see procedure note below.    Neurological:      Mental Status: She is alert and oriented to person, place, and time.   Psychiatric:         Mood and Affect: Mood normal.         Behavior: Behavior normal.         Thought Content: Thought content normal.         Judgment: Judgment normal.           Assessment & Plan   Diagnoses and all orders for this visit:    1. Nexplanon removal (Primary)    2. Breakthrough bleeding on Nexplanon    3. Encounter for BCP (birth control pills) initial prescription  -     Drospirenone (Slynd) 4 MG tablet; Take 1 tablet by mouth Daily.  Dispense: 28 tablet; Refill: 2    -Normal pelvic exam, reviewed findings with patient.   -Reviewed all contraception options with patient, since she has migraines with aura often I advised progesterone only birth control methods.  Patient would like to have Nexplanon removed today and trial of progesterone only pills, states that if bleeding does not stop with the pills she will then consider an IUD.  -Nexplanon removed successfully, see procedure note.  -Slynd sample given to patient and prescription sent to pharmacy.  Risk and benefits reviewed.  Pill schedule reviewed.  Follow-up in 3 months to assess treatment efficacy.    All questions answered. Patient verbalizes understanding and is agreeable to plan.  Return in about 3 months (around 2/18/2025) for gyn f/u - birth control f/u.     Nexplanon Removal    Date of procedure:  11/18/2024    Risks and benefits  discussed? yes  All questions answered? yes  Consents given by the patient  Written consent obtained? Yes  Reason for removal:                     irregular bleeding  Time out was performed prior to procedure.     Local anesthesia used:  yes - 5 cc's of  Meds; anesthesia local: 1% lidocaine with epinephrine    Procedure documentation:    The upper left arm (non-dominant) was marked at the intended site of removal.  Betadine was used to cleanse the skin.  Local anesthesia was injected.  A vertical incision was created at the distal tip of the implant.  The implant was removed intact without difficulty.  Steri-strips were then placed across the site of insertion and the arm was wrapped.    She tolerated the procedure well.  There were no complications.  EBL was minimal.    Post procedure instructions: Remove the wrapping in 24 hours and the steri-strips in 5 days.    Contraception:  Discussed, will start progesterone-only pills Slynd.     Follow up needed: 3 months for birth control f/u, or sooner as needed

## 2025-05-01 ENCOUNTER — TELEPHONE (OUTPATIENT)
Dept: OBSTETRICS AND GYNECOLOGY | Age: 30
End: 2025-05-01

## 2025-05-01 NOTE — TELEPHONE ENCOUNTER
Caller: NOHEMY HENRIQUEZ  Relationship to patient: SELF  Best call back number: [phone]   Service:IN PERSON  Is  needs updated in registration: - YES   Date of Appointment: JUNE 2ND 8:30 U/S & 9AM OB VISIT

## 2025-05-12 ENCOUNTER — TELEPHONE (OUTPATIENT)
Dept: OBSTETRICS AND GYNECOLOGY | Age: 30
End: 2025-05-12
Payer: COMMERCIAL

## 2025-05-12 NOTE — TELEPHONE ENCOUNTER
Nessa Perales    [phone]    WENT TO THE ER THIS WEEKEND (NOTES IN CHART) AND NEEDS A F/U APPT     WAS TOLD SHE IS FURTHER ALONG IN HER PREGNANCY 6wks     SHE WOULD LIKE TO BE SEEN ON 5/30/25

## 2025-06-02 ENCOUNTER — OFFICE VISIT (OUTPATIENT)
Dept: OBSTETRICS AND GYNECOLOGY | Age: 30
End: 2025-06-02
Payer: COMMERCIAL

## 2025-06-02 VITALS
SYSTOLIC BLOOD PRESSURE: 110 MMHG | HEIGHT: 66 IN | WEIGHT: 140 LBS | DIASTOLIC BLOOD PRESSURE: 64 MMHG | BODY MASS INDEX: 22.5 KG/M2

## 2025-06-02 DIAGNOSIS — Z34.81 PRENATAL CARE, SUBSEQUENT PREGNANCY, FIRST TRIMESTER: Primary | ICD-10-CM

## 2025-06-02 DIAGNOSIS — O20.0 THREATENED MISCARRIAGE IN EARLY PREGNANCY: ICD-10-CM

## 2025-06-02 PROBLEM — Z34.82 PRENATAL CARE, SUBSEQUENT PREGNANCY, SECOND TRIMESTER: Status: RESOLVED | Noted: 2024-06-04 | Resolved: 2025-06-02

## 2025-06-02 PROBLEM — O09.899 HISTORY OF PRETERM DELIVERY, CURRENTLY PREGNANT: Status: ACTIVE | Noted: 2025-06-02

## 2025-06-02 RX ORDER — ASPIRIN 81 MG/1
81 TABLET ORAL DAILY
Qty: 90 TABLET | Refills: 2 | Status: SHIPPED | OUTPATIENT
Start: 2025-06-02

## 2025-06-02 RX ORDER — PNV NO.95/FERROUS FUM/FOLIC AC 28MG-0.8MG
1 TABLET ORAL DAILY
Qty: 30 TABLET | Refills: 11 | Status: SHIPPED | OUTPATIENT
Start: 2025-06-02

## 2025-06-02 NOTE — PROGRESS NOTES
Subjective       History of Present Illness  Nessa Perales is a 30 y.o. female is being seen today for threatened miscarriage  Patient's last menstrual period is uncertain, she had some vaginal bleeding was seen in the emergency room.  This was almost a month ago.  Since that time the bleeding has stopped.  Chief Complaint   Patient presents with    Gynecologic Exam     Gyn follow up: Pregnancy confirmation,lmp 25,u/s today,  Requesting new rx for baby aspirin and prenatal vitamin   .        The following portions of the patient's history were reviewed and updated as appropriate: allergies, current medications, past family history, past medical history, past social history, past surgical history and problem list.    PAST MEDICAL HISTORY  Past Medical History:   Diagnosis Date    Preeclampsia      OB History    Para Term  AB Living   5 3 2 1 1 3   SAB IAB Ectopic Molar Multiple Live Births       0 3      # Outcome Date GA Lbr Mynor/2nd Weight Sex Type Anes PTL Lv   5 Current            4  24 27w3d  1170 g (2 lb 9.3 oz) F CS-LTranv Spinal Y JOAN      Birth Comments: panda OR 1      Complications:  premature rupture of membranes (PPROM) with unknown onset of labor   3 Term  40w0d   M CS-LTranv   JOAN   2 Term  40w0d   F Vag-Spont   JOAN   1 AB  5w0d            Past Surgical History:   Procedure Laterality Date     SECTION       SECTION N/A 2024    Procedure:  SECTION REPEAT;  Surgeon: Sukumar Garvin MD;  Location: [facility] LABOR DELIVERY;  Service: Obstetrics/Gynecology;  Laterality: N/A;     Family History   Problem Relation Age of Onset    Breast cancer Maternal Aunt 43     Social History     Tobacco Use   Smoking Status Never   Smokeless Tobacco Never       Current Outpatient Medications:     aspirin 81 MG EC tablet, Take 1 tablet by mouth Daily., Disp: 90 tablet, Rfl: 2    Prenatal Vit-Fe Fumarate-FA (Prenatal Vitamin) 27-0.8 MG tablet,  Take 1 capsule by mouth Daily., Disp: 30 tablet, Rfl: 11  Immunization History   Administered Date(s) Administered    Flublok 18+yrs 10/18/2023    Tdap 10/18/2023       Review of Systems       Except as outlined in history of physical illness, patient denies any changes in her GYN, , GI systems. All other systems reviewed are negative.    Objective   Physical Exam   Alert and oriented, respirations unlabored, heart regular rate and rhythm   Pelvic see ultrasound report and notes below      Assessment & Plan   Diagnoses and all orders for this visit:    1. Prenatal care, subsequent pregnancy, first trimester (Primary)  -     POC Urinalysis Dipstick  -     Urine Culture - Urine, Urine, Clean Catch    2. Threatened miscarriage in early pregnancy  -     OB Panel With HIV and RPR  -     Progesterone  -     HCG, B-subunit, Quantitative    Other orders  -     aspirin 81 MG EC tablet; Take 1 tablet by mouth Daily.  Dispense: 90 tablet; Refill: 2  -     Prenatal Vit-Fe Fumarate-FA (Prenatal Vitamin) 27-0.8 MG tablet; Take 1 capsule by mouth Daily.  Dispense: 30 tablet; Refill: 11    Today's ultrasound consistent with a single viable intrauterine pregnancy at 9 weeks 1 day, given an WALT of 1/4/2026  Will start prenatal vitamins today  Start baby aspirin at 13 weeks  We will check new OB lab work  Schedule new OB visit and we will check cell free DNA at the time.    With a  we discussed the importance of regular prenatal care given the high risk nature of her pregnancy with prior PIH and premature rupture membranes.  Both she and the father baby voiced understanding and agreement             Orders Placed This Encounter   Procedures    Urine Culture - Urine, Urine, Clean Catch     Release to patient:   Routine Release [[id number]]    OB Panel With HIV and RPR     Release to patient:   Routine Release [[id number]]    Progesterone     Release to patient:   Routine Release [[id number]]    HCG, B-subunit,  Quantitative     Release to patient:   Routine Release [[id number]]    POC Urinalysis Dipstick     Release to patient:   Routine Release [[id number]]           EMR Dragon/ Transcription disclaimer:  Much of the encounter note is an electronic transcription/translation of spoken language to printed text. The electronic translation of spoken language may permit erroneous, or at times, nonessential words or phrases to be inadvertently transcribes; Although i have reviewed the note for such errors, some may still exist.

## 2025-06-03 LAB
ABO GROUP BLD: NORMAL
BASOPHILS # BLD AUTO: 0 X10E3/UL (ref 0–0.2)
BASOPHILS NFR BLD AUTO: 0 %
BLD GP AB SCN SERPL QL: NEGATIVE
EOSINOPHIL # BLD AUTO: 0 X10E3/UL (ref 0–0.4)
EOSINOPHIL NFR BLD AUTO: 1 %
ERYTHROCYTE [DISTWIDTH] IN BLOOD BY AUTOMATED COUNT: 13.2 % (ref 11.7–15.4)
HBV SURFACE AG SERPL QL IA: NEGATIVE
HCG INTACT+B SERPL-ACNC: NORMAL MIU/ML
HCT VFR BLD AUTO: 44.2 % (ref 34–46.6)
HCV AB SERPL QL IA: NON REACTIVE
HCV AB SERPL QL IA: NORMAL
HGB BLD-MCNC: 14.1 G/DL (ref 11.1–15.9)
HIV 1+2 AB+HIV1 P24 AG SERPL QL IA: NON REACTIVE
IMM GRANULOCYTES # BLD AUTO: 0 X10E3/UL (ref 0–0.1)
IMM GRANULOCYTES NFR BLD AUTO: 0 %
LYMPHOCYTES # BLD AUTO: 2 X10E3/UL (ref 0.7–3.1)
LYMPHOCYTES NFR BLD AUTO: 27 %
MCH RBC QN AUTO: 28 PG (ref 26.6–33)
MCHC RBC AUTO-ENTMCNC: 31.9 G/DL (ref 31.5–35.7)
MCV RBC AUTO: 88 FL (ref 79–97)
MONOCYTES # BLD AUTO: 0.7 X10E3/UL (ref 0.1–0.9)
MONOCYTES NFR BLD AUTO: 9 %
NEUTROPHILS # BLD AUTO: 4.8 X10E3/UL (ref 1.4–7)
NEUTROPHILS NFR BLD AUTO: 63 %
PLATELET # BLD AUTO: 162 X10E3/UL (ref 150–450)
PROGEST SERPL-MCNC: 16 NG/ML
RBC # BLD AUTO: 5.04 X10E6/UL (ref 3.77–5.28)
RH BLD: POSITIVE
RPR SER QL: NON REACTIVE
RUBV IGG SERPL IA-ACNC: 1.51 INDEX
WBC # BLD AUTO: 7.5 X10E3/UL (ref 3.4–10.8)

## 2025-06-04 LAB
BACTERIA UR CULT: NO GROWTH
BACTERIA UR CULT: NORMAL

## 2025-06-30 ENCOUNTER — ROUTINE PRENATAL (OUTPATIENT)
Dept: OBSTETRICS AND GYNECOLOGY | Age: 30
End: 2025-06-30
Payer: COMMERCIAL

## 2025-06-30 VITALS — WEIGHT: 139 LBS | DIASTOLIC BLOOD PRESSURE: 60 MMHG | SYSTOLIC BLOOD PRESSURE: 120 MMHG | BODY MASS INDEX: 22.44 KG/M2

## 2025-06-30 DIAGNOSIS — Z34.81 SUPERVISION OF NORMAL INTRAUTERINE PREGNANCY IN MULTIGRAVIDA IN FIRST TRIMESTER: Primary | ICD-10-CM

## 2025-06-30 DIAGNOSIS — Z13.89 SCREENING FOR HEMATURIA OR PROTEINURIA: ICD-10-CM

## 2025-06-30 DIAGNOSIS — O09.899 PRIOR PREGNANCY COMPLICATED BY PIH, ANTEPARTUM: ICD-10-CM

## 2025-06-30 DIAGNOSIS — Z3A.13 13 WEEKS GESTATION OF PREGNANCY: ICD-10-CM

## 2025-06-30 DIAGNOSIS — O09.899 HISTORY OF PRETERM DELIVERY, CURRENTLY PREGNANT: ICD-10-CM

## 2025-06-30 PROCEDURE — 99213 OFFICE O/P EST LOW 20 MIN: CPT

## 2025-06-30 NOTE — PROGRESS NOTES
Chief Complaint   Patient presents with    Follow-up     Ob follow up   Check up        HPI: 30 y.o.  at 13w1d presents for OB intake.  Patient of Dr. Bautista  She is doing well today.   Denies loss of fluid, vaginal bleeding, and contractions.    C/o headaches, mostly yesterday. Also hips hurt and pelvic pressure. Tylenol helps.   C/o clear/white vaginal discharge. No vaginal itching. Would like pelvic exam.   Wants to complete cfDNA today. Would like gender results in envelope.   Completed new OB labs last appt.   Carrier screening previously completed and was negative for 14 out of 14 diseases.   She picked up BASA prescription but has not started it yet.     Relevant data reviewed:  [employer] 14 (Pan-Ethnic Standard) - Blood, (2024 09:47)     Last OB US Data (since 2024)       None          Vitals:    25 1049   BP: 120/60   Weight: 63 kg (139 lb)     Total weight gain for pregnancy:  Not found.    Review of systems:   Gen: negative  CV:     negative  GI: negative  :   pelvic pressure and vaginal discharge  MS:    negative  Neuro: headache  Pul: negative    Physical Exam  Constitutional:       General: She is not in acute distress.  Cardiovascular:      Rate and Rhythm: Normal rate and regular rhythm.   Pulmonary:      Effort: Pulmonary effort is normal.      Breath sounds: Normal breath sounds.   Abdominal:      Palpations: Abdomen is soft.      Tenderness: There is no abdominal tenderness.   Genitourinary:     Exam position: Lithotomy position.      Labia:         Right: No rash, tenderness or lesion.         Left: No rash, tenderness or lesion.       Vagina: Normal.      Cervix: Normal.      Comments: Normal vaginal discharge. Nitrazine test strip is yellow, not blue. Cervix closed. No bleeding.  Skin:     General: Skin is warm.   Neurological:      Mental Status: She is alert and oriented to person, place, and time.   Psychiatric:         Mood and Affect: Mood normal.          Behavior: Behavior normal.         Thought Content: Thought content normal.         Judgment: Judgment normal.         A/P  1. Intrauterine pregnancy at 13w1d   2. Pregnancy Risk:  HIGH RISK    Diagnoses and all orders for this visit:    1. Supervision of normal intrauterine pregnancy in multigravida in first trimester (Primary)  -     Lissa Clements Prenatal Test: Chromosomes 13, 18, 21, X & Y: Triploidy 22Q.11.2 Deletion - Blood,    2. 13 weeks gestation of pregnancy    3. Screening for hematuria or proteinuria  -     POC Urinalysis Dipstick    4. History of  delivery, currently pregnant  Overview:  Premature rupture membranes at 27 weeks with chorioamnionitis and breech presentation      5. Prior pregnancy complicated by PIH, antepartum  Overview:  Baby aspirin prescribed          -----------------------  PLAN:   -Normal reassuring pelvic exam, reviewed with pt.  -cfDNA completed today. Will call patient with results and treat accordingly. Pt wants to  gender results in envelope.   -Start baby aspirin.   -Continue tylenol for headaches and drink lots of water. Call if symptoms worsen or do not improve.   -SAB warnings reviewed.    [place] interpretor used for this visit.  Return in about 4 weeks (around 2025) for OB f/u with Dr. Bautista.    Marie Garcia PA-C  2025 20:31 EDT

## 2025-07-21 ENCOUNTER — TELEPHONE (OUTPATIENT)
Dept: OBSTETRICS AND GYNECOLOGY | Age: 30
End: 2025-07-21
Payer: COMMERCIAL

## 2025-07-21 NOTE — TELEPHONE ENCOUNTER
Pt called and stated she had a wisdom tooth extracted and there is pain and swelling. Pt wants to know what medication is safe to take since she's pregnant.

## 2025-07-31 ENCOUNTER — HOSPITAL ENCOUNTER (EMERGENCY)
Facility: HOSPITAL | Age: 30
Discharge: HOME | End: 2025-07-31
Attending: EMERGENCY MEDICINE
Payer: COMMERCIAL

## 2025-07-31 VITALS
HEART RATE: 94 BPM | DIASTOLIC BLOOD PRESSURE: 92 MMHG | RESPIRATION RATE: 16 BRPM | OXYGEN SATURATION: 100 % | TEMPERATURE: 97.3 F | SYSTOLIC BLOOD PRESSURE: 141 MMHG

## 2025-07-31 DIAGNOSIS — K04.7 DENTAL INFECTION: Primary | ICD-10-CM

## 2025-07-31 DIAGNOSIS — Z3A.17 17 WEEKS GESTATION OF PREGNANCY: ICD-10-CM

## 2025-07-31 LAB
BILIRUB UR QL STRIP: NEGATIVE
CLARITY UR: CLEAR
COLOR UR: YELLOW
GLUCOSE UR STRIP-MCNC: NEGATIVE MG/DL
HGB UR QL STRIP.AUTO: NEGATIVE
KETONES UR QL STRIP: ABNORMAL
LEUKOCYTE ESTERASE UR QL STRIP.AUTO: NEGATIVE
NITRITE UR QL STRIP: NEGATIVE
PH UR STRIP.AUTO: 7 [PH] (ref 5–8)
PROT UR QL STRIP: NEGATIVE
SP GR UR STRIP: 1.01 (ref 1–1.03)
UROBILINOGEN UR QL STRIP: ABNORMAL

## 2025-07-31 PROCEDURE — 96365 THER/PROPH/DIAG IV INF INIT: CPT

## 2025-07-31 PROCEDURE — 81003 URINALYSIS AUTO W/O SCOPE: CPT | Performed by: EMERGENCY MEDICINE

## 2025-07-31 PROCEDURE — 99283 EMERGENCY DEPT VISIT LOW MDM: CPT

## 2025-07-31 PROCEDURE — 25010000002 AMPICILLIN-SULBACTAM PER 1.5 G: Performed by: EMERGENCY MEDICINE

## 2025-07-31 RX ORDER — ACETAMINOPHEN 500 MG
1000 TABLET ORAL ONCE
Status: COMPLETED | OUTPATIENT
Start: 2025-07-31 | End: 2025-07-31

## 2025-07-31 RX ORDER — ACETAMINOPHEN 500 MG
1000 TABLET ORAL EVERY 8 HOURS PRN
Qty: 30 TABLET | Refills: 0 | Status: SHIPPED | OUTPATIENT
Start: 2025-07-31

## 2025-07-31 RX ADMIN — AMPICILLIN SODIUM AND SULBACTAM SODIUM 3 G: 2; 1 INJECTION, POWDER, FOR SOLUTION INTRAMUSCULAR; INTRAVENOUS at 17:47

## 2025-07-31 RX ADMIN — ACETAMINOPHEN 1000 MG: 500 TABLET, FILM COATED ORAL at 17:47

## 2025-07-31 NOTE — ED TRIAGE NOTES
Pt is 17w 4d pregnant, reports rt sided facial swelling after wisdom teeth were pulled, seen at [facility] 4 days ago for same.    L&D requested ed dilip

## 2025-07-31 NOTE — ED PROVIDER NOTES
EMERGENCY DEPARTMENT ENCOUNTER  Room Number:    Date of encounter:  2025  PCP: Provider, No Known  Patient Care Team:  Provider, No Known as PCP - General     HPI:  Context: Nessa Perales is a 30 y.o. female who presents to the ED c/o chief complaint of right-sided dental swelling.  Patient reports that she had a dental extraction performed at the [facility] earlier this month.  Patient reports that she followed up 5 days later because she continued to have gingival swelling and pain.  Patient reports that she was prescribed amoxicillin but she did not start taking it because she was concerned about possibility of infecting her baby.  Patient reports that the pain and swelling have worsened.  Patient reports that she was seen at outside emergency department several days ago and prescribed clindamycin, reports that she has been taking it for the last 2 days and has not seen improvement.  Patient denies any voice change, eating and drinking normally, denies any fevers.  Patient does report pain with eating and drinking.  Patient reports that she has been taking Tylenol.  Patient is currently 17 weeks 4 days pregnant.  Patient denies any contractions, no vaginal bleeding discharge or irritation, no vaginal loss of fluid.  Patient denies any urinary symptoms.    MEDICAL HISTORY REVIEW  Reviewed in EPIC    PAST MEDICAL HISTORY  Active Ambulatory Problems     Diagnosis Date Noted    Low platelet count 2024    Prior pregnancy complicated by PIH, antepartum 2024    Pregnancy 2024    Previous  section 2024    Footling breech presentation 2024     premature rupture of membranes (PPROM) with onset of labor within 24 hours of rupture in third trimester, antepartum 2024    S/P  section 2024    Endometritis 2024    History of  delivery, currently pregnant 2025     Resolved Ambulatory Problems     Diagnosis Date Noted     Asymptomatic bacteriuria in pregnancy 2024    Prenatal care, subsequent pregnancy, second trimester 2024     Past Medical History:   Diagnosis Date    Preeclampsia        PAST SURGICAL HISTORY  Past Surgical History:   Procedure Laterality Date     SECTION       SECTION N/A 2024    Procedure:  SECTION REPEAT;  Surgeon: Sukumar Garvin MD;  Location: [facility] LABOR DELIVERY;  Service: Obstetrics/Gynecology;  Laterality: N/A;       FAMILY HISTORY  Family History   Problem Relation Age of Onset    Breast cancer Maternal Aunt 43       SOCIAL HISTORY  Social History     Socioeconomic History    Marital status: Single   Tobacco Use    Smoking status: Never    Smokeless tobacco: Never   Vaping Use    Vaping status: Never Used   Substance and Sexual Activity    Alcohol use: Not Currently    Drug use: Never    Sexual activity: Yes     Partners: Male     Birth control/protection: None       ALLERGIES  Patient has no known allergies.    The patient's allergies have been reviewed    PHYSICAL EXAM  I have reviewed the triage vital signs and nursing notes.  ED Triage Vitals   Temp Heart Rate Resp BP SpO2   25 1552 25 1552 25 1552 25 1553 25 1552   96.8 °F (36 °C) 107 18 141/92 100 %      Temp src Heart Rate Source Patient Position BP Location FiO2 (%)   -- -- -- -- --              General: No acute distress.  HENT: NCAT, PERRL, Nares patent.  Right-sided gingival erythema and swelling, uvula midline, no uvula crowding, voice is normal, no difficulty handling secretions.  Eyes: no scleral icterus.  Neck: trachea midline, no ROM limitations.  No swelling, neck supple  CV: regular rhythm, regular rate.  Respiratory: normal effort, CTAB.  Abdomen: soft, nondistended, NTTP, no rebound tenderness, no guarding or rigidity.  Musculoskeletal: no deformity.  Neuro: alert, moves all extremities, follows commands.  Skin: warm, dry.    LAB RESULTS  Recent Results (from the  past 24 hours)   Urinalysis With Microscopic If Indicated (No Culture) - Urine, Clean Catch    Collection Time: 07/31/25  5:50 PM    Specimen: Urine, Clean Catch   Result Value Ref Range    Color, UA Yellow Yellow, Straw    Appearance, UA Clear Clear    pH, UA 7.0 5.0 - 8.0    Specific Gravity, UA 1.006 1.005 - 1.030    Glucose, UA Negative Negative    Ketones, UA Trace (A) Negative    Bilirubin, UA Negative Negative    Blood, UA Negative Negative    Protein, UA Negative Negative    Leuk Esterase, UA Negative Negative    Nitrite, UA Negative Negative    Urobilinogen, UA 0.2 E.U./dL 0.2 - 1.0 E.U./dL       I ordered the above labs and reviewed the results.    RADIOLOGY  No Radiology Exams Resulted Within Past 24 Hours    I ordered the above noted radiological studies. I reviewed the images and results. I agree with the radiologist interpretation.    PROCEDURES  Procedures    MEDICATIONS GIVEN IN ER  Medications   ampicillin-sulbactam (UNASYN) 3 g in sodium chloride 0.9 % 100 mL MBP (3 g Intravenous New Bag 7/31/25 1747)   acetaminophen (TYLENOL) tablet 1,000 mg (1,000 mg Oral Given 7/31/25 1747)       PROGRESS, DATA ANALYSIS, CONSULTS, AND MEDICAL DECISION MAKING  A complete history and physical exam have been performed.  All available laboratory and imaging results have been reviewed by myself prior to disposition.    MDM    After the initial H&P, I discussed pertinent information from history and physical exam with patient/family.  Discussed differential diagnosis.  Discussed plan for ED evaluation/workup/treatment.  All questions answered.  Patient/family is agreeable with plan.  ED Course as of 07/31/25 1836   Thu Jul 31, 2025   1647 Patient presents with dental infection.  Offered CT imaging to evaluate for deep space infection, had extensive discussion risks and benefits, after discussion patient declines.  I believe this is reasonable as patient has no alarm symptoms and a reassuring exam.  I discussed plan  for IV antibiotic here and discharged on Augmentin.  Did discuss need to check urinalysis as well as fetal heart tones.  Patient is agreeable with plan. [JG]      ED Course User Index  [JG] Erasto Zaidi MD       AS OF 18:36 EDT VITALS:    BP - 141/92  HR - 107  TEMP - 96.8 °F (36 °C)  O2 SATS - 100%    DIAGNOSIS  Final diagnoses:   Dental infection   17 weeks gestation of pregnancy         DISPOSITION  DISCHARGE    Patient discharged in stable condition.    Reviewed implications of results, diagnosis, meds, responsibility to follow up, warning signs and symptoms of possible worsening, potential complications and reasons to return to ER.    Patient/Family voiced understanding of above instructions.    Discussed plan for discharge, as there is no emergent indication for admission. Patient referred to primary care provider for BP management due to today's BP. Pt/family is agreeable and understands need for follow up and repeat testing.  Pt is aware that discharge does not mean that nothing is wrong but it indicates no emergency is present that requires admission and they must continue care with follow-up as given below or physician of their choice.     FOLLOW-UP  Your PCP    Schedule an appointment as soon as possible for a visit in 2 days  even if well    PATIENT CONNECTION - [facility] [id number] [phone]    if you are unable to follow up with your PCP    your dentist    Schedule an appointment as soon as possible for a visit in 2 days           Medication List        New Prescriptions      amoxicillin-clavulanate 875-125 MG per tablet  Commonly known as: AUGMENTIN  [place] 1 tableta por via oral 2 (dos) veces al brittni por 10 dean.  (Take 1 tablet by mouth 2 (Two) Times a Day for 10 days.)            Changed      acetaminophen 500 MG tablet  Commonly known as: TYLENOL  [place] 2 tabletas oral cada 8 horas marcellus sea necesario para dolor leve  (Take 2 tablets by mouth Every 8 (Eight) Hours As Needed  for Mild Pain.)  What changed:   medication strength  how much to take  when to take this  reasons to take this            Stop      clindamycin 150 MG capsule  Commonly known as: CLEOCIN               Where to Get Your Medications        These medications were sent to [place] [id number]      Hours: Monday to Friday 7 AM to 6 PM, Saturday & Sunday 8 AM to 4:30 PM (Closed 12 PM to 12:30 PM) Phone: [phone]   acetaminophen 500 MG tablet  amoxicillin-clavulanate 875-125 MG per tablet            Erasto Zaidi MD  07/31/25 1974       Erasto Zaidi MD  07/31/25 [id number]

## 2025-08-04 ENCOUNTER — ROUTINE PRENATAL (OUTPATIENT)
Dept: OBSTETRICS AND GYNECOLOGY | Age: 30
End: 2025-08-04
Payer: COMMERCIAL

## 2025-08-04 VITALS — BODY MASS INDEX: 22.27 KG/M2 | DIASTOLIC BLOOD PRESSURE: 60 MMHG | SYSTOLIC BLOOD PRESSURE: 110 MMHG | WEIGHT: 138 LBS

## 2025-08-04 DIAGNOSIS — O09.899 PRIOR PREGNANCY COMPLICATED BY PIH, ANTEPARTUM: ICD-10-CM

## 2025-08-04 DIAGNOSIS — Z34.82 PRENATAL CARE, SUBSEQUENT PREGNANCY, SECOND TRIMESTER: Primary | ICD-10-CM

## 2025-08-04 LAB
BILIRUB BLD-MCNC: NEGATIVE MG/DL
CLARITY, POC: CLEAR
COLOR UR: YELLOW
GLUCOSE UR STRIP-MCNC: NEGATIVE MG/DL
KETONES UR QL: NEGATIVE
LEUKOCYTE EST, POC: NEGATIVE
NITRITE UR-MCNC: NEGATIVE MG/ML
PH UR: 6.5 [PH] (ref 5–8)
PROT UR STRIP-MCNC: NEGATIVE MG/DL
RBC # UR STRIP: NEGATIVE /UL
SP GR UR: 1.01 (ref 1–1.03)
UROBILINOGEN UR QL: NORMAL

## 2025-08-04 PROCEDURE — 99213 OFFICE O/P EST LOW 20 MIN: CPT | Performed by: OBSTETRICS & GYNECOLOGY

## 2025-08-04 RX ORDER — MULTIPLE VITAMINS W/ MINERALS TAB 9MG-400MCG
1 TAB ORAL DAILY
COMMUNITY

## 2025-08-23 ENCOUNTER — HOSPITAL ENCOUNTER (EMERGENCY)
Facility: HOSPITAL | Age: 30
Discharge: HOME | End: 2025-08-23
Attending: OBSTETRICS & GYNECOLOGY | Admitting: OBSTETRICS & GYNECOLOGY
Payer: COMMERCIAL

## 2025-08-29 ENCOUNTER — APPOINTMENT (OUTPATIENT)
Dept: GENERAL RADIOLOGY | Facility: HOSPITAL | Age: 30
End: 2025-08-29
Payer: COMMERCIAL

## 2025-08-29 ENCOUNTER — HOSPITAL ENCOUNTER (EMERGENCY)
Facility: HOSPITAL | Age: 30
Discharge: HOME | End: 2025-08-29
Attending: OBSTETRICS & GYNECOLOGY | Admitting: OBSTETRICS & GYNECOLOGY
Payer: COMMERCIAL

## 2025-08-29 VITALS
RESPIRATION RATE: 17 BRPM | DIASTOLIC BLOOD PRESSURE: 72 MMHG | OXYGEN SATURATION: 98 % | SYSTOLIC BLOOD PRESSURE: 116 MMHG | TEMPERATURE: 98 F | HEART RATE: 87 BPM

## 2025-08-29 DIAGNOSIS — Z3A.21 21 WEEKS GESTATION OF PREGNANCY: ICD-10-CM

## 2025-08-29 DIAGNOSIS — E87.6 HYPOKALEMIA: ICD-10-CM

## 2025-08-29 DIAGNOSIS — R06.02 SHORTNESS OF BREATH: Primary | ICD-10-CM

## 2025-08-29 LAB
ALBUMIN SERPL-MCNC: 3.5 G/DL (ref 3.5–5.2)
ALBUMIN/GLOB SERPL: 1 G/DL
ALP SERPL-CCNC: 41 U/L (ref 39–117)
ALT SERPL W P-5'-P-CCNC: 10 U/L (ref 1–33)
ANION GAP SERPL CALCULATED.3IONS-SCNC: 12 MMOL/L (ref 5–15)
AST SERPL-CCNC: 15 U/L (ref 1–32)
BASOPHILS # BLD AUTO: 0.03 10*3/MM3 (ref 0–0.2)
BASOPHILS NFR BLD AUTO: 0.3 % (ref 0–1.5)
BILIRUB SERPL-MCNC: 0.2 MG/DL (ref 0–1.2)
BILIRUB UR QL STRIP: NEGATIVE
BILIRUB UR QL STRIP: NEGATIVE
BUN SERPL-MCNC: 5 MG/DL (ref 6–20)
BUN/CREAT SERPL: 11.6 (ref 7–25)
CALCIUM SPEC-SCNC: 8.4 MG/DL (ref 8.6–10.5)
CHLORIDE SERPL-SCNC: 105 MMOL/L (ref 98–107)
CLARITY UR: ABNORMAL
CLARITY UR: ABNORMAL
CO2 SERPL-SCNC: 20 MMOL/L (ref 22–29)
COLOR UR: YELLOW
COLOR UR: YELLOW
CREAT SERPL-MCNC: 0.43 MG/DL (ref 0.57–1)
D DIMER PPP FEU-MCNC: 0.52 MCGFEU/ML (ref 0–0.5)
DEPRECATED RDW RBC AUTO: 43 FL (ref 37–54)
EGFRCR SERPLBLD CKD-EPI 2021: 133.5 ML/MIN/1.73
EOSINOPHIL # BLD AUTO: 0.05 10*3/MM3 (ref 0–0.4)
EOSINOPHIL NFR BLD AUTO: 0.5 % (ref 0.3–6.2)
ERYTHROCYTE [DISTWIDTH] IN BLOOD BY AUTOMATED COUNT: 13.6 % (ref 12.3–15.4)
GEN 5 1HR TROPONIN T REFLEX: <6 NG/L
GLOBULIN UR ELPH-MCNC: 3.5 GM/DL
GLUCOSE SERPL-MCNC: 109 MG/DL (ref 65–99)
GLUCOSE UR STRIP-MCNC: NEGATIVE MG/DL
GLUCOSE UR STRIP-MCNC: NEGATIVE MG/DL
HCT VFR BLD AUTO: 36.4 % (ref 34–46.6)
HGB BLD-MCNC: 12 G/DL (ref 12–15.9)
HGB UR QL STRIP.AUTO: NEGATIVE
HGB UR QL STRIP.AUTO: NEGATIVE
HOLD SPECIMEN: NORMAL
HOLD SPECIMEN: NORMAL
IMM GRANULOCYTES # BLD AUTO: 0.05 10*3/MM3 (ref 0–0.05)
IMM GRANULOCYTES NFR BLD AUTO: 0.5 % (ref 0–0.5)
KETONES UR QL STRIP: NEGATIVE
KETONES UR QL STRIP: NEGATIVE
LEUKOCYTE ESTERASE UR QL STRIP.AUTO: NEGATIVE
LEUKOCYTE ESTERASE UR QL STRIP.AUTO: NEGATIVE
LIPASE SERPL-CCNC: 32 U/L (ref 13–60)
LYMPHOCYTES # BLD AUTO: 1.72 10*3/MM3 (ref 0.7–3.1)
LYMPHOCYTES NFR BLD AUTO: 15.9 % (ref 19.6–45.3)
MCH RBC QN AUTO: 28.8 PG (ref 26.6–33)
MCHC RBC AUTO-ENTMCNC: 33 G/DL (ref 31.5–35.7)
MCV RBC AUTO: 87.3 FL (ref 79–97)
MONOCYTES # BLD AUTO: 0.69 10*3/MM3 (ref 0.1–0.9)
MONOCYTES NFR BLD AUTO: 6.4 % (ref 5–12)
NEUTROPHILS NFR BLD AUTO: 76.4 % (ref 42.7–76)
NEUTROPHILS NFR BLD AUTO: 8.27 10*3/MM3 (ref 1.7–7)
NITRITE UR QL STRIP: NEGATIVE
NITRITE UR QL STRIP: NEGATIVE
NT-PROBNP SERPL-MCNC: 51.5 PG/ML (ref 0–450)
PH UR STRIP.AUTO: 7.5 [PH] (ref 5–8)
PH UR STRIP.AUTO: 8 [PH] (ref 5–8)
PLATELET # BLD AUTO: 143 10*3/MM3 (ref 140–450)
PMV BLD AUTO: 10.6 FL (ref 6–12)
POTASSIUM SERPL-SCNC: 3.2 MMOL/L (ref 3.5–5.2)
PROT SERPL-MCNC: 7 G/DL (ref 6–8.5)
PROT UR QL STRIP: NEGATIVE
PROT UR QL STRIP: NEGATIVE
QT INTERVAL: 362 MS
QTC INTERVAL: 440 MS
RBC # BLD AUTO: 4.17 10*6/MM3 (ref 3.77–5.28)
SODIUM SERPL-SCNC: 137 MMOL/L (ref 136–145)
SP GR UR STRIP: 1.01 (ref 1–1.03)
SP GR UR STRIP: 1.01 (ref 1–1.03)
TROPONIN T NUMERIC DELTA: NORMAL
TROPONIN T SERPL HS-MCNC: <6 NG/L
UROBILINOGEN UR QL STRIP: ABNORMAL
UROBILINOGEN UR QL STRIP: ABNORMAL
WBC NRBC COR # BLD AUTO: 10.81 10*3/MM3 (ref 3.4–10.8)
WHOLE BLOOD HOLD COAG: NORMAL
WHOLE BLOOD HOLD SPECIMEN: NORMAL

## 2025-08-29 PROCEDURE — 85025 COMPLETE CBC W/AUTO DIFF WBC: CPT

## 2025-08-29 PROCEDURE — 81003 URINALYSIS AUTO W/O SCOPE: CPT | Performed by: OBSTETRICS & GYNECOLOGY

## 2025-08-29 PROCEDURE — 36415 COLL VENOUS BLD VENIPUNCTURE: CPT

## 2025-08-29 PROCEDURE — 81003 URINALYSIS AUTO W/O SCOPE: CPT | Performed by: EMERGENCY MEDICINE

## 2025-08-29 PROCEDURE — 83690 ASSAY OF LIPASE: CPT

## 2025-08-29 PROCEDURE — 93005 ELECTROCARDIOGRAM TRACING: CPT

## 2025-08-29 PROCEDURE — 71045 X-RAY EXAM CHEST 1 VIEW: CPT

## 2025-08-29 PROCEDURE — 84484 ASSAY OF TROPONIN QUANT: CPT

## 2025-08-29 PROCEDURE — 85379 FIBRIN DEGRADATION QUANT: CPT | Performed by: EMERGENCY MEDICINE

## 2025-08-29 PROCEDURE — 83880 ASSAY OF NATRIURETIC PEPTIDE: CPT | Performed by: EMERGENCY MEDICINE

## 2025-08-29 PROCEDURE — 80053 COMPREHEN METABOLIC PANEL: CPT

## 2025-08-29 PROCEDURE — 87086 URINE CULTURE/COLONY COUNT: CPT | Performed by: OBSTETRICS & GYNECOLOGY

## 2025-08-29 RX ORDER — SODIUM CHLORIDE 0.9 % (FLUSH) 0.9 %
10 SYRINGE (ML) INJECTION AS NEEDED
Status: DISCONTINUED | OUTPATIENT
Start: 2025-08-29 | End: 2025-08-29 | Stop reason: HOSPADM

## 2025-08-31 LAB — BACTERIA SPEC AEROBE CULT: ABNORMAL

## 2025-09-01 RX ORDER — AMOXICILLIN 500 MG/1
500 CAPSULE ORAL 3 TIMES DAILY
Qty: 15 CAPSULE | Refills: 0 | Status: SHIPPED | OUTPATIENT
Start: 2025-09-01

## (undated) DEVICE — SUT MNCRYL 0/0 CTX 36IN Y398H

## (undated) DEVICE — SUT VIC 3/0 CTI 36IN J944H

## (undated) DEVICE — GLV SURG BIOGEL LTX PF 7 1/2

## (undated) DEVICE — ANTIBACTERIAL UNDYED BRAIDED (POLYGLACTIN 910), SYNTHETIC ABSORBABLE SUTURE: Brand: COATED VICRYL

## (undated) DEVICE — SUT MNCRYL PLS ANTIB UD 4/0 SH 27IN

## (undated) DEVICE — SOL IRR H2O BTL 1000ML STRL

## (undated) DEVICE — 3M™ TEGADERM™ TRANSPARENT FILM DRESSING FRAME STYLE, 1627, 4 IN X 10 IN (10 CM X 25 CM), 20/CT 4CT/CASE: Brand: 3M™ TEGADERM™

## (undated) DEVICE — NDL BLNT 18G 1 1/2IN

## (undated) DEVICE — Device: Brand: PORTEX